# Patient Record
Sex: FEMALE | Race: WHITE | NOT HISPANIC OR LATINO | Employment: UNEMPLOYED | URBAN - METROPOLITAN AREA
[De-identification: names, ages, dates, MRNs, and addresses within clinical notes are randomized per-mention and may not be internally consistent; named-entity substitution may affect disease eponyms.]

---

## 2017-01-16 RX ORDER — CYCLOBENZAPRINE HCL 10 MG
TABLET ORAL
Qty: 30 TABLET | Refills: 0 | Status: SHIPPED | OUTPATIENT
Start: 2017-01-16 | End: 2017-02-10 | Stop reason: SDUPTHER

## 2017-01-16 RX ORDER — DULOXETIN HYDROCHLORIDE 60 MG/1
CAPSULE, DELAYED RELEASE ORAL
Qty: 30 CAPSULE | Refills: 0 | Status: SHIPPED | OUTPATIENT
Start: 2017-01-16 | End: 2017-02-10 | Stop reason: SDUPTHER

## 2017-01-16 RX ORDER — BUSPIRONE HYDROCHLORIDE 10 MG/1
TABLET ORAL
Qty: 90 TABLET | Refills: 0 | Status: SHIPPED | OUTPATIENT
Start: 2017-01-16 | End: 2017-02-10 | Stop reason: SDUPTHER

## 2017-02-10 RX ORDER — CYCLOBENZAPRINE HCL 10 MG
TABLET ORAL
Qty: 30 TABLET | Refills: 0 | Status: SHIPPED | OUTPATIENT
Start: 2017-02-10 | End: 2017-03-13 | Stop reason: SDUPTHER

## 2017-02-10 RX ORDER — DULOXETIN HYDROCHLORIDE 60 MG/1
CAPSULE, DELAYED RELEASE ORAL
Qty: 30 CAPSULE | Refills: 0 | Status: SHIPPED | OUTPATIENT
Start: 2017-02-10 | End: 2017-03-13 | Stop reason: SDUPTHER

## 2017-02-10 RX ORDER — BUSPIRONE HYDROCHLORIDE 10 MG/1
TABLET ORAL
Qty: 90 TABLET | Refills: 0 | Status: SHIPPED | OUTPATIENT
Start: 2017-02-10 | End: 2017-03-13 | Stop reason: SDUPTHER

## 2017-03-06 RX ORDER — BUSPIRONE HYDROCHLORIDE 10 MG/1
TABLET ORAL
Qty: 90 TABLET | Refills: 0 | OUTPATIENT
Start: 2017-03-06

## 2017-03-06 RX ORDER — DULOXETIN HYDROCHLORIDE 60 MG/1
CAPSULE, DELAYED RELEASE ORAL
Qty: 30 CAPSULE | Refills: 0 | OUTPATIENT
Start: 2017-03-06

## 2017-03-06 RX ORDER — CYCLOBENZAPRINE HCL 10 MG
TABLET ORAL
Qty: 30 TABLET | Refills: 0 | OUTPATIENT
Start: 2017-03-06

## 2017-03-13 ENCOUNTER — OFFICE VISIT (OUTPATIENT)
Dept: FAMILY MEDICINE CLINIC | Facility: CLINIC | Age: 42
End: 2017-03-13

## 2017-03-13 VITALS
SYSTOLIC BLOOD PRESSURE: 118 MMHG | OXYGEN SATURATION: 97 % | WEIGHT: 215.4 LBS | HEIGHT: 66 IN | DIASTOLIC BLOOD PRESSURE: 80 MMHG | BODY MASS INDEX: 34.62 KG/M2 | RESPIRATION RATE: 16 BRPM | HEART RATE: 103 BPM

## 2017-03-13 DIAGNOSIS — M54.50 CHRONIC LOW BACK PAIN WITHOUT SCIATICA, UNSPECIFIED BACK PAIN LATERALITY: ICD-10-CM

## 2017-03-13 DIAGNOSIS — F41.1 ANXIETY, GENERALIZED: Primary | ICD-10-CM

## 2017-03-13 DIAGNOSIS — G89.29 CHRONIC LOW BACK PAIN WITHOUT SCIATICA, UNSPECIFIED BACK PAIN LATERALITY: ICD-10-CM

## 2017-03-13 PROCEDURE — 99213 OFFICE O/P EST LOW 20 MIN: CPT | Performed by: FAMILY MEDICINE

## 2017-03-13 RX ORDER — BUSPIRONE HYDROCHLORIDE 10 MG/1
10 TABLET ORAL 3 TIMES DAILY
Qty: 90 TABLET | Refills: 2 | Status: SHIPPED | OUTPATIENT
Start: 2017-03-13 | End: 2017-05-30 | Stop reason: SDUPTHER

## 2017-03-13 RX ORDER — IBUPROFEN 600 MG/1
600 TABLET ORAL EVERY 8 HOURS PRN
Qty: 90 TABLET | Refills: 2 | Status: SHIPPED | OUTPATIENT
Start: 2017-03-13 | End: 2018-01-10 | Stop reason: SDUPTHER

## 2017-03-13 RX ORDER — DULOXETIN HYDROCHLORIDE 60 MG/1
60 CAPSULE, DELAYED RELEASE ORAL DAILY
Qty: 30 CAPSULE | Refills: 2 | Status: SHIPPED | OUTPATIENT
Start: 2017-03-13 | End: 2017-05-28 | Stop reason: SDUPTHER

## 2017-03-13 RX ORDER — CYCLOBENZAPRINE HCL 10 MG
10 TABLET ORAL
Qty: 30 TABLET | Refills: 2 | Status: SHIPPED | OUTPATIENT
Start: 2017-03-13 | End: 2018-09-11 | Stop reason: SDUPTHER

## 2017-03-14 NOTE — PROGRESS NOTES
"Asa Lo is a 41 y.o. female.     Anxiety   Presents for follow-up visit. Symptoms include nervous/anxious behavior. Patient reports no confusion, decreased concentration, depressed mood, dizziness, nausea, palpitations, restlessness, shortness of breath or suicidal ideas. Symptoms occur most days. The severity of symptoms is moderate. The quality of sleep is fair. Nighttime awakenings: occasional.       Back Pain   This is a chronic problem. The problem occurs constantly. The problem is unchanged. The pain is present in the lumbar spine. The quality of the pain is described as aching. The pain does not radiate. The pain is moderate. Pertinent negatives include no abdominal pain, headaches or weakness. Treatments tried: treated at pain clinic.        The following portions of the patient's history were reviewed and updated as appropriate: allergies, current medications, past social history and problem list.    Review of Systems   Constitutional: Negative for appetite change and fatigue.   Respiratory: Negative for chest tightness and shortness of breath.    Cardiovascular: Negative for palpitations.   Gastrointestinal: Negative for abdominal pain, diarrhea and nausea.   Musculoskeletal: Positive for back pain.   Neurological: Negative for dizziness, tremors, weakness, light-headedness and headaches.   Psychiatric/Behavioral: Negative for agitation, behavioral problems, confusion, decreased concentration, dysphoric mood, sleep disturbance and suicidal ideas. The patient is nervous/anxious.         Chronic pain        Objective   Visit Vitals   • /80   • Pulse 103   • Resp 16   • Ht 66\" (167.6 cm)   • Wt 215 lb 6.4 oz (97.7 kg)   • SpO2 97%   • BMI 34.77 kg/m2     Physical Exam   Constitutional: She is oriented to person, place, and time. She appears well-developed and well-nourished. She is cooperative.   HENT:   Head: Normocephalic.   Right Ear: External ear normal.   Left Ear: External ear " normal.   Nose: Nose normal.   Mouth/Throat: Oropharynx is clear and moist.   Eyes: Conjunctivae are normal. Pupils are equal, round, and reactive to light. No scleral icterus.   Neck: Neck supple. Carotid bruit is not present. No thyromegaly present.   Cardiovascular: Normal rate and regular rhythm.    Pulmonary/Chest: Effort normal and breath sounds normal.   Abdominal: There is no hepatosplenomegaly.   Musculoskeletal: Normal range of motion.   Neurological: She is alert and oriented to person, place, and time.   No focal deficits no lateralizing signs   Skin: Skin is warm and dry. No rash noted.   Psychiatric: She has a normal mood and affect. Her behavior is normal. Judgment and thought content normal. Cognition and memory are normal.   Nursing note and vitals reviewed.      Assessment/Plan   Problem List Items Addressed This Visit        Nervous and Auditory    Chronic low back pain       Other    Anxiety, generalized - Primary    Relevant Medications    DULoxetine (CYMBALTA) 60 MG capsule    busPIRone (BUSPAR) 10 MG tablet          New Medications Ordered This Visit   Medications   • DULoxetine (CYMBALTA) 60 MG capsule     Sig: Take 1 capsule by mouth Daily.     Dispense:  30 capsule     Refill:  2   • busPIRone (BUSPAR) 10 MG tablet     Sig: Take 1 tablet by mouth 3 (Three) Times a Day.     Dispense:  90 tablet     Refill:  2     Call office to make appt for further refills.   • cyclobenzaprine (FLEXERIL) 10 MG tablet     Sig: Take 1 tablet by mouth every night at bedtime.     Dispense:  30 tablet     Refill:  2     Call office to make appt for further refills.   • ibuprofen (ADVIL,MOTRIN) 600 MG tablet     Sig: Take 1 tablet by mouth Every 8 (Eight) Hours As Needed for Mild Pain (1-3).     Dispense:  90 tablet     Refill:  2

## 2017-05-30 RX ORDER — BUSPIRONE HYDROCHLORIDE 10 MG/1
TABLET ORAL
Qty: 90 TABLET | Refills: 0 | Status: SHIPPED | OUTPATIENT
Start: 2017-05-30 | End: 2017-06-27 | Stop reason: SDUPTHER

## 2017-05-30 RX ORDER — DULOXETIN HYDROCHLORIDE 60 MG/1
CAPSULE, DELAYED RELEASE ORAL
Qty: 30 CAPSULE | Refills: 3 | Status: SHIPPED | OUTPATIENT
Start: 2017-05-30 | End: 2017-10-02

## 2017-06-27 RX ORDER — BUSPIRONE HYDROCHLORIDE 10 MG/1
TABLET ORAL
Qty: 90 TABLET | Refills: 0 | Status: SHIPPED | OUTPATIENT
Start: 2017-06-27 | End: 2017-07-25 | Stop reason: SDUPTHER

## 2017-07-25 RX ORDER — BUSPIRONE HYDROCHLORIDE 10 MG/1
TABLET ORAL
Qty: 90 TABLET | Refills: 0 | Status: SHIPPED | OUTPATIENT
Start: 2017-07-25 | End: 2017-09-01 | Stop reason: SDUPTHER

## 2017-09-01 RX ORDER — BUSPIRONE HYDROCHLORIDE 10 MG/1
TABLET ORAL
Qty: 90 TABLET | Refills: 0 | Status: SHIPPED | OUTPATIENT
Start: 2017-09-01 | End: 2018-01-10

## 2017-09-27 RX ORDER — DULOXETIN HYDROCHLORIDE 60 MG/1
CAPSULE, DELAYED RELEASE ORAL
Qty: 30 CAPSULE | Refills: 0 | Status: SHIPPED | OUTPATIENT
Start: 2017-09-27 | End: 2017-10-25 | Stop reason: SDUPTHER

## 2017-10-25 RX ORDER — DULOXETIN HYDROCHLORIDE 60 MG/1
CAPSULE, DELAYED RELEASE ORAL
Qty: 30 CAPSULE | Refills: 0 | Status: SHIPPED | OUTPATIENT
Start: 2017-10-25 | End: 2017-11-19 | Stop reason: SDUPTHER

## 2017-10-30 RX ORDER — BUSPIRONE HYDROCHLORIDE 10 MG/1
TABLET ORAL
Qty: 90 TABLET | Refills: 0 | Status: SHIPPED | OUTPATIENT
Start: 2017-10-30 | End: 2018-01-10 | Stop reason: SDUPTHER

## 2017-11-21 RX ORDER — DULOXETIN HYDROCHLORIDE 60 MG/1
CAPSULE, DELAYED RELEASE ORAL
Qty: 30 CAPSULE | Refills: 0 | Status: SHIPPED | OUTPATIENT
Start: 2017-11-21 | End: 2017-12-17 | Stop reason: SDUPTHER

## 2017-11-26 RX ORDER — BUSPIRONE HYDROCHLORIDE 10 MG/1
TABLET ORAL
Qty: 90 TABLET | Refills: 0 | OUTPATIENT
Start: 2017-11-26

## 2017-12-18 RX ORDER — DULOXETIN HYDROCHLORIDE 60 MG/1
CAPSULE, DELAYED RELEASE ORAL
Qty: 30 CAPSULE | Refills: 0 | Status: SHIPPED | OUTPATIENT
Start: 2017-12-18 | End: 2018-01-10 | Stop reason: SDUPTHER

## 2018-01-07 ENCOUNTER — OFFICE VISIT (OUTPATIENT)
Dept: FAMILY MEDICINE CLINIC | Facility: CLINIC | Age: 43
End: 2018-01-07

## 2018-01-07 VITALS
WEIGHT: 202 LBS | HEART RATE: 110 BPM | OXYGEN SATURATION: 99 % | HEIGHT: 66 IN | BODY MASS INDEX: 32.47 KG/M2 | RESPIRATION RATE: 16 BRPM | TEMPERATURE: 98.5 F | SYSTOLIC BLOOD PRESSURE: 124 MMHG | DIASTOLIC BLOOD PRESSURE: 80 MMHG

## 2018-01-07 DIAGNOSIS — J01.10 ACUTE FRONTAL SINUSITIS, RECURRENCE NOT SPECIFIED: Primary | ICD-10-CM

## 2018-01-07 PROCEDURE — 96372 THER/PROPH/DIAG INJ SC/IM: CPT | Performed by: NURSE PRACTITIONER

## 2018-01-07 PROCEDURE — 99213 OFFICE O/P EST LOW 20 MIN: CPT | Performed by: NURSE PRACTITIONER

## 2018-01-07 RX ORDER — METHYLPREDNISOLONE ACETATE 40 MG/ML
40 INJECTION, SUSPENSION INTRA-ARTICULAR; INTRALESIONAL; INTRAMUSCULAR; SOFT TISSUE ONCE
Status: COMPLETED | OUTPATIENT
Start: 2018-01-07 | End: 2018-01-07

## 2018-01-07 RX ORDER — HYDROCODONE BITARTRATE AND ACETAMINOPHEN 5; 325 MG/1; MG/1
TABLET ORAL
COMMUNITY
Start: 2017-12-12 | End: 2018-09-11

## 2018-01-07 RX ORDER — CEPHALEXIN 500 MG/1
500 CAPSULE ORAL 3 TIMES DAILY
Qty: 21 CAPSULE | Refills: 0 | Status: SHIPPED | OUTPATIENT
Start: 2018-01-07 | End: 2018-01-14

## 2018-01-07 RX ORDER — METHYLPREDNISOLONE ACETATE 80 MG/ML
80 INJECTION, SUSPENSION INTRA-ARTICULAR; INTRALESIONAL; INTRAMUSCULAR; SOFT TISSUE ONCE
Qty: 1 ML | Refills: 0 | Status: SHIPPED | OUTPATIENT
Start: 2018-01-07 | End: 2018-01-07

## 2018-01-07 RX ORDER — KETOROLAC TROMETHAMINE 30 MG/ML
60 INJECTION, SOLUTION INTRAMUSCULAR; INTRAVENOUS ONCE
Status: COMPLETED | OUTPATIENT
Start: 2018-01-07 | End: 2018-01-07

## 2018-01-07 RX ADMIN — METHYLPREDNISOLONE ACETATE 40 MG: 40 INJECTION, SUSPENSION INTRA-ARTICULAR; INTRALESIONAL; INTRAMUSCULAR; SOFT TISSUE at 14:30

## 2018-01-07 RX ADMIN — KETOROLAC TROMETHAMINE 60 MG: 30 INJECTION, SOLUTION INTRAMUSCULAR; INTRAVENOUS at 12:36

## 2018-01-07 NOTE — PROGRESS NOTES
Subjective   Elizabeth Lo is a 42 y.o. female.   Chief Complaint   Patient presents with   • Earache   • Headache    Acute Visit only   Earache    There is pain in the right ear. This is a new problem. The current episode started in the past 7 days. The problem occurs constantly. The problem has been unchanged. The pain is mild. Associated symptoms include coughing, headaches, rhinorrhea and a sore throat. Pertinent negatives include no abdominal pain, drainage, hearing loss or vomiting. Treatments tried: mucinex, ibuprofen and aspirin  The treatment provided mild relief. There is no history of a chronic ear infection, hearing loss or a tympanostomy tube.   Headache    This is a new problem. The current episode started in the past 7 days. The problem occurs intermittently. The problem has been waxing and waning. The pain is located in the right unilateral and frontal region. The pain does not radiate. Similar to prior headaches: headache in the past - this time is only on one side.  The quality of the pain is described as pulsating and aching. The pain is at a severity of 5/10. The pain is moderate. Associated symptoms include coughing, ear pain, a fever, muscle aches, rhinorrhea, sinus pressure and a sore throat. Pertinent negatives include no abdominal pain, abnormal behavior, anorexia, back pain, blurred vision, dizziness, drainage, eye pain, eye redness, eye watering, facial sweating, hearing loss or vomiting. The symptoms are aggravated by activity. She has tried NSAIDs (mucinex ) for the symptoms. The treatment provided mild relief. Her past medical history is significant for migraine headaches. There is no history of cancer, cluster headaches, hypertension, immunosuppression, migraines in the family, obesity, pseudotumor cerebri, recent head traumas, sinus disease or TMJ.    Head ache has been off an on  X 2 weeks.   Sinus pressure and thick yellow drainage     The following portions of the patient's  "history were reviewed and updated as appropriate: allergies, current medications, past family history, past medical history, past social history, past surgical history and problem list.    Review of Systems   Constitutional: Positive for fever.   HENT: Positive for ear pain, postnasal drip, rhinorrhea, sinus pain, sinus pressure and sore throat. Negative for hearing loss, trouble swallowing and voice change.         Ears feel full.   Right side of fore head lots of pressure.   Thick yellow nasal drainage    Eyes: Negative for blurred vision, pain and redness.   Respiratory: Positive for cough.    Gastrointestinal: Negative for abdominal pain, anorexia and vomiting.   Musculoskeletal: Negative for back pain.   Neurological: Positive for headaches. Negative for dizziness.       Objective   No Known Allergies  Visit Vitals   • /80   • Pulse 110   • Temp 98.5 °F (36.9 °C)   • Resp 16   • Ht 167.6 cm (66\")   • Wt 91.6 kg (202 lb)   • SpO2 99%   • BMI 32.6 kg/m2       Physical Exam   Constitutional: She is oriented to person, place, and time. She appears well-developed and well-nourished. She is cooperative. She appears ill.   HENT:   Right Ear: Hearing, tympanic membrane, external ear and ear canal normal.   Left Ear: Hearing, tympanic membrane, external ear and ear canal normal.   Nose: Mucosal edema present. Right sinus exhibits frontal sinus tenderness. Right sinus exhibits no maxillary sinus tenderness. Left sinus exhibits no maxillary sinus tenderness and no frontal sinus tenderness.   Mouth/Throat: Uvula is midline and mucous membranes are normal. Posterior oropharyngeal erythema present.   Right frontal tenderness to palpation.   Yellow purulent discharge.      Pulmonary/Chest: Effort normal and breath sounds normal.   Neurological: She is alert and oriented to person, place, and time. She has normal strength. No cranial nerve deficit or sensory deficit. She displays a negative Romberg sign. GCS eye subscore " is 4. GCS verbal subscore is 5. GCS motor subscore is 6.   Skin: Skin is warm, dry and intact.   Vitals reviewed.      Assessment/Plan   Elizabeth was seen today for earache and headache.    Diagnoses and all orders for this visit:    Acute frontal sinusitis, recurrence not specified  -     Discontinue: methylPREDNISolone acetate (DEPO-medrol) 80 MG/ML injection; Inject 1 mL into the shoulder, thigh, or buttocks 1 (One) Time for 1 dose.  -     cephalexin (KEFLEX) 500 MG capsule; Take 1 capsule by mouth 3 (Three) Times a Day for 7 days.  -     ketorolac (TORADOL) injection 60 mg; Inject 60 mg into the shoulder, thigh, or buttocks 1 (One) Time.  -     Chlorcyclizine-Pseudoephed 25-60 MG tablet; Take 25 mg by mouth 2 (Two) Times a Day As Needed (congestion) for up to 21 days.  -     methylPREDNISolone acetate (DEPO-medrol) injection 40 mg; Inject 1 mL into the shoulder, thigh, or buttocks 1 (One) Time.      Follow up as needed.   See instructions for sinusitis.   Patient is agreeable with plan.            NIKOS Zamudio

## 2018-01-10 ENCOUNTER — OFFICE VISIT (OUTPATIENT)
Dept: FAMILY MEDICINE CLINIC | Facility: CLINIC | Age: 43
End: 2018-01-10

## 2018-01-10 VITALS
WEIGHT: 204.8 LBS | OXYGEN SATURATION: 98 % | HEIGHT: 66 IN | DIASTOLIC BLOOD PRESSURE: 80 MMHG | HEART RATE: 108 BPM | RESPIRATION RATE: 16 BRPM | BODY MASS INDEX: 32.92 KG/M2 | SYSTOLIC BLOOD PRESSURE: 112 MMHG

## 2018-01-10 DIAGNOSIS — R59.0 ENLARGED LYMPH NODE IN NECK: ICD-10-CM

## 2018-01-10 DIAGNOSIS — F41.1 ANXIETY, GENERALIZED: Primary | ICD-10-CM

## 2018-01-10 PROCEDURE — 99213 OFFICE O/P EST LOW 20 MIN: CPT | Performed by: FAMILY MEDICINE

## 2018-01-10 RX ORDER — IBUPROFEN 600 MG/1
600 TABLET ORAL EVERY 8 HOURS PRN
Qty: 90 TABLET | Refills: 2 | Status: SHIPPED | OUTPATIENT
Start: 2018-01-10 | End: 2018-09-11 | Stop reason: SDUPTHER

## 2018-01-10 RX ORDER — DULOXETIN HYDROCHLORIDE 60 MG/1
60 CAPSULE, DELAYED RELEASE ORAL DAILY
Qty: 30 CAPSULE | Refills: 2 | Status: SHIPPED | OUTPATIENT
Start: 2018-01-10 | End: 2018-03-28 | Stop reason: SDUPTHER

## 2018-01-10 RX ORDER — BUSPIRONE HYDROCHLORIDE 10 MG/1
10 TABLET ORAL
Qty: 90 TABLET | Refills: 2 | Status: SHIPPED | OUTPATIENT
Start: 2018-01-10 | End: 2018-09-11

## 2018-01-11 NOTE — PROGRESS NOTES
"Asa Lo is a 42 y.o. female.     Anxiety   Presents for follow-up visit. Symptoms include nervous/anxious behavior. Patient reports no compulsions, confusion, decreased concentration, depressed mood, dizziness, insomnia, nausea, panic, shortness of breath or suicidal ideas. Symptoms occur occasionally. The severity of symptoms is moderate. The quality of sleep is fair. Nighttime awakenings: occasional.            The following portions of the patient's history were reviewed and updated as appropriate: allergies, current medications, past social history and problem list.    Review of Systems   Constitutional: Negative for appetite change and fatigue.   HENT: Negative for congestion and postnasal drip.         Enlarged node left side of neck not painful   Respiratory: Negative for cough, chest tightness and shortness of breath.    Gastrointestinal: Negative for abdominal pain, diarrhea and nausea.   Genitourinary: Negative for dysuria and hematuria.   Neurological: Negative for dizziness, tremors, weakness, light-headedness and headaches.   Hematological: Positive for adenopathy.   Psychiatric/Behavioral: Negative for agitation, behavioral problems, confusion, decreased concentration, dysphoric mood, sleep disturbance and suicidal ideas. The patient is nervous/anxious. The patient does not have insomnia.        Objective   /80  Pulse 108  Resp 16  Ht 167.6 cm (66\")  Wt 92.9 kg (204 lb 12.8 oz)  SpO2 98%  BMI 33.06 kg/m2  Physical Exam   Constitutional: She is oriented to person, place, and time. She appears well-developed and well-nourished. She is cooperative.   HENT:   Head: Normocephalic.   Right Ear: External ear normal.   Left Ear: External ear normal.   Nose: Nose normal.   Mouth/Throat: Oropharynx is clear and moist.   Eyes: Conjunctivae are normal. Pupils are equal, round, and reactive to light. No scleral icterus.   Neck: Neck supple. Carotid bruit is not present. No " thyromegaly present.   Solitary node left mid posterior cervical chain freely mobile   Cardiovascular: Normal rate, regular rhythm and normal heart sounds.    Pulmonary/Chest: Effort normal and breath sounds normal.   Abdominal: There is no hepatosplenomegaly.   Musculoskeletal: Normal range of motion.   Lymphadenopathy:     She has cervical adenopathy.   Neurological: She is alert and oriented to person, place, and time.   No focal deficits no lateralizing signs   Skin: Skin is warm and dry. No rash noted.   Psychiatric: She has a normal mood and affect. Her behavior is normal. Thought content normal. Cognition and memory are normal.   Nursing note and vitals reviewed.      Assessment/Plan   Problem List Items Addressed This Visit     Anxiety, generalized - Primary    Relevant Medications    busPIRone (BUSPAR) 10 MG tablet    DULoxetine (CYMBALTA) 60 MG capsule      Other Visit Diagnoses     Enlarged lymph node in neck              New Medications Ordered This Visit   Medications   • busPIRone (BUSPAR) 10 MG tablet     Sig: Take 1 tablet by mouth 3 (Three) Times a Day With Meals.     Dispense:  90 tablet     Refill:  2   • DULoxetine (CYMBALTA) 60 MG capsule     Sig: Take 1 capsule by mouth Daily.     Dispense:  30 capsule     Refill:  2   • ibuprofen (ADVIL,MOTRIN) 600 MG tablet     Sig: Take 1 tablet by mouth Every 8 (Eight) Hours As Needed for Mild Pain .     Dispense:  90 tablet     Refill:  2     rtc if node enlarges

## 2018-01-15 RX ORDER — DULOXETIN HYDROCHLORIDE 60 MG/1
CAPSULE, DELAYED RELEASE ORAL
Qty: 30 CAPSULE | Refills: 0 | OUTPATIENT
Start: 2018-01-15

## 2018-03-29 RX ORDER — DULOXETIN HYDROCHLORIDE 60 MG/1
60 CAPSULE, DELAYED RELEASE ORAL DAILY
Qty: 30 CAPSULE | Refills: 5 | Status: SHIPPED | OUTPATIENT
Start: 2018-03-29 | End: 2018-09-11 | Stop reason: SDUPTHER

## 2018-09-11 ENCOUNTER — OFFICE VISIT (OUTPATIENT)
Dept: FAMILY MEDICINE CLINIC | Facility: CLINIC | Age: 43
End: 2018-09-11

## 2018-09-11 VITALS
SYSTOLIC BLOOD PRESSURE: 124 MMHG | BODY MASS INDEX: 36.17 KG/M2 | HEIGHT: 66 IN | WEIGHT: 225.1 LBS | RESPIRATION RATE: 16 BRPM | DIASTOLIC BLOOD PRESSURE: 86 MMHG | HEART RATE: 89 BPM | OXYGEN SATURATION: 98 %

## 2018-09-11 DIAGNOSIS — M54.5 CHRONIC MIDLINE LOW BACK PAIN, WITH SCIATICA PRESENCE UNSPECIFIED: Primary | ICD-10-CM

## 2018-09-11 DIAGNOSIS — G89.29 CHRONIC MIDLINE LOW BACK PAIN, WITH SCIATICA PRESENCE UNSPECIFIED: Primary | ICD-10-CM

## 2018-09-11 DIAGNOSIS — F41.1 ANXIETY, GENERALIZED: ICD-10-CM

## 2018-09-11 PROCEDURE — 99213 OFFICE O/P EST LOW 20 MIN: CPT | Performed by: FAMILY MEDICINE

## 2018-09-11 RX ORDER — DULOXETIN HYDROCHLORIDE 60 MG/1
60 CAPSULE, DELAYED RELEASE ORAL DAILY
Qty: 30 CAPSULE | Refills: 5 | Status: SHIPPED | OUTPATIENT
Start: 2018-09-11 | End: 2019-02-04 | Stop reason: SDUPTHER

## 2018-09-11 RX ORDER — IBUPROFEN 600 MG/1
600 TABLET ORAL EVERY 8 HOURS PRN
Qty: 90 TABLET | Refills: 2 | Status: SHIPPED | OUTPATIENT
Start: 2018-09-11 | End: 2019-08-05 | Stop reason: SDUPTHER

## 2018-09-11 RX ORDER — CYCLOBENZAPRINE HCL 10 MG
10 TABLET ORAL 3 TIMES DAILY PRN
Qty: 90 TABLET | Refills: 2 | Status: SHIPPED | OUTPATIENT
Start: 2018-09-11 | End: 2018-12-11 | Stop reason: SDUPTHER

## 2018-09-11 NOTE — PROGRESS NOTES
Subjective   Elizabeth Lo is a 43 y.o. female.     Anxiety   Presents for follow-up visit. Symptoms include nervous/anxious behavior. Patient reports no confusion, decreased concentration, depressed mood, dizziness, nausea, palpitations, restlessness, shortness of breath or suicidal ideas. Symptoms occur most days. The severity of symptoms is moderate. The quality of sleep is fair. Nighttime awakenings: occasional.       Back Pain   This is a chronic (Chronic back pain has been under the care of a pain treatment center but now needs another referral to another pain center.) problem. The problem occurs constantly. The problem is unchanged. The pain is present in the lumbar spine. The quality of the pain is described as aching. The pain does not radiate. The pain is moderate. Pertinent negatives include no abdominal pain, dysuria, headaches, numbness or weakness. She has tried analgesics (treated at pain clinic) for the symptoms. The treatment provided moderate relief.        The following portions of the patient's history were reviewed and updated as appropriate: allergies, current medications, past social history and problem list.    Review of Systems   Constitutional: Negative.  Negative for appetite change and fatigue.   HENT: Negative for congestion and sore throat.    Respiratory: Negative.  Negative for chest tightness and shortness of breath.    Cardiovascular: Negative for palpitations.   Gastrointestinal: Negative.  Negative for abdominal pain, diarrhea and nausea.   Genitourinary: Negative.  Negative for dysuria and hematuria.   Musculoskeletal: Positive for back pain. Negative for arthralgias, gait problem and myalgias.   Neurological: Negative for dizziness, tremors, weakness, light-headedness, numbness and headaches.   Psychiatric/Behavioral: Positive for dysphoric mood and sleep disturbance. Negative for agitation, behavioral problems, confusion, decreased concentration and suicidal ideas. The  "patient is nervous/anxious.        Objective   /86   Pulse 89   Resp 16   Ht 167.6 cm (66\")   Wt 102 kg (225 lb 1.6 oz)   SpO2 98%   BMI 36.33 kg/m²   Physical Exam   Constitutional: She is oriented to person, place, and time. She appears well-developed and well-nourished. She is cooperative.   HENT:   Head: Normocephalic and atraumatic.   Eyes: Pupils are equal, round, and reactive to light. Conjunctivae are normal. No scleral icterus.   Neck: Neck supple. No JVD present. Carotid bruit is not present. No thyromegaly present.   Cardiovascular: Normal rate and regular rhythm.    Pulmonary/Chest: Effort normal and breath sounds normal.   Abdominal: There is no hepatosplenomegaly.   Musculoskeletal: Normal range of motion.   Back exam unchanged   Neurological: She is alert and oriented to person, place, and time.   No focal deficits no lateralizing signs   Skin: Skin is warm and dry. No rash noted.   Psychiatric: She has a normal mood and affect. Cognition and memory are normal.   Nursing note and vitals reviewed.      Assessment/Plan   Problem List Items Addressed This Visit     Chronic low back pain - Primary    Relevant Orders    Ambulatory Referral to Pain Management    Anxiety, generalized    Relevant Medications    DULoxetine (CYMBALTA) 60 MG capsule          New Medications Ordered This Visit   Medications   • cyclobenzaprine (FLEXERIL) 10 MG tablet     Sig: Take 1 tablet by mouth 3 (Three) Times a Day As Needed for Muscle Spasms.     Dispense:  90 tablet     Refill:  2     Call office to make appt for further refills.   • DULoxetine (CYMBALTA) 60 MG capsule     Sig: Take 1 capsule by mouth Daily.     Dispense:  30 capsule     Refill:  5   • ibuprofen (ADVIL,MOTRIN) 600 MG tablet     Sig: Take 1 tablet by mouth Every 8 (Eight) Hours As Needed for Mild Pain .     Dispense:  90 tablet     Refill:  2              "

## 2018-12-11 RX ORDER — CYCLOBENZAPRINE HCL 10 MG
TABLET ORAL
Qty: 90 TABLET | Refills: 0 | Status: SHIPPED | OUTPATIENT
Start: 2018-12-11 | End: 2019-01-04 | Stop reason: SDUPTHER

## 2019-01-07 RX ORDER — CYCLOBENZAPRINE HCL 10 MG
TABLET ORAL
Qty: 90 TABLET | Refills: 0 | Status: SHIPPED | OUTPATIENT
Start: 2019-01-07 | End: 2019-02-04 | Stop reason: SDUPTHER

## 2019-02-04 ENCOUNTER — OFFICE VISIT (OUTPATIENT)
Dept: FAMILY MEDICINE CLINIC | Facility: CLINIC | Age: 44
End: 2019-02-04

## 2019-02-04 VITALS
RESPIRATION RATE: 16 BRPM | HEART RATE: 118 BPM | WEIGHT: 204.2 LBS | SYSTOLIC BLOOD PRESSURE: 132 MMHG | HEIGHT: 66 IN | DIASTOLIC BLOOD PRESSURE: 80 MMHG | BODY MASS INDEX: 32.82 KG/M2 | OXYGEN SATURATION: 98 %

## 2019-02-04 DIAGNOSIS — G89.29 CHRONIC BILATERAL LOW BACK PAIN WITH BILATERAL SCIATICA: ICD-10-CM

## 2019-02-04 DIAGNOSIS — F41.1 ANXIETY, GENERALIZED: Primary | ICD-10-CM

## 2019-02-04 DIAGNOSIS — M54.42 CHRONIC BILATERAL LOW BACK PAIN WITH BILATERAL SCIATICA: ICD-10-CM

## 2019-02-04 DIAGNOSIS — M54.41 CHRONIC BILATERAL LOW BACK PAIN WITH BILATERAL SCIATICA: ICD-10-CM

## 2019-02-04 PROCEDURE — 99213 OFFICE O/P EST LOW 20 MIN: CPT | Performed by: FAMILY MEDICINE

## 2019-02-04 PROCEDURE — 90471 IMMUNIZATION ADMIN: CPT | Performed by: FAMILY MEDICINE

## 2019-02-04 PROCEDURE — 90686 IIV4 VACC NO PRSV 0.5 ML IM: CPT | Performed by: FAMILY MEDICINE

## 2019-02-04 RX ORDER — DULOXETIN HYDROCHLORIDE 60 MG/1
60 CAPSULE, DELAYED RELEASE ORAL DAILY
Qty: 30 CAPSULE | Refills: 5 | Status: SHIPPED | OUTPATIENT
Start: 2019-02-04 | End: 2019-08-05 | Stop reason: SDUPTHER

## 2019-02-04 RX ORDER — CYCLOBENZAPRINE HCL 10 MG
10 TABLET ORAL 3 TIMES DAILY PRN
Qty: 90 TABLET | Refills: 2 | Status: SHIPPED | OUTPATIENT
Start: 2019-02-04 | End: 2019-05-09 | Stop reason: SDUPTHER

## 2019-02-05 RX ORDER — GABAPENTIN 100 MG/1
CAPSULE ORAL
Qty: 60 CAPSULE | Refills: 2
Start: 2019-02-05 | End: 2019-11-06 | Stop reason: DRUGHIGH

## 2019-05-06 RX ORDER — GABAPENTIN 100 MG/1
CAPSULE ORAL
Qty: 60 CAPSULE | Refills: 0 | OUTPATIENT
Start: 2019-05-06

## 2019-05-09 RX ORDER — CYCLOBENZAPRINE HCL 10 MG
TABLET ORAL
Qty: 90 TABLET | Refills: 0 | Status: SHIPPED | OUTPATIENT
Start: 2019-05-09 | End: 2019-08-05 | Stop reason: SDUPTHER

## 2019-06-03 RX ORDER — GABAPENTIN 100 MG/1
CAPSULE ORAL
Qty: 60 CAPSULE | Refills: 0 | OUTPATIENT
Start: 2019-06-03

## 2019-08-05 ENCOUNTER — OFFICE VISIT (OUTPATIENT)
Dept: FAMILY MEDICINE CLINIC | Facility: CLINIC | Age: 44
End: 2019-08-05

## 2019-08-05 VITALS
SYSTOLIC BLOOD PRESSURE: 120 MMHG | WEIGHT: 191.6 LBS | TEMPERATURE: 97.3 F | DIASTOLIC BLOOD PRESSURE: 78 MMHG | RESPIRATION RATE: 16 BRPM | HEART RATE: 99 BPM | OXYGEN SATURATION: 97 % | HEIGHT: 66 IN | BODY MASS INDEX: 30.79 KG/M2

## 2019-08-05 DIAGNOSIS — M54.42 CHRONIC BILATERAL LOW BACK PAIN WITH BILATERAL SCIATICA: ICD-10-CM

## 2019-08-05 DIAGNOSIS — S76.012A STRAIN OF HIP FLEXOR, LEFT, INITIAL ENCOUNTER: Primary | ICD-10-CM

## 2019-08-05 DIAGNOSIS — F41.1 ANXIETY, GENERALIZED: ICD-10-CM

## 2019-08-05 DIAGNOSIS — M54.41 CHRONIC BILATERAL LOW BACK PAIN WITH BILATERAL SCIATICA: ICD-10-CM

## 2019-08-05 DIAGNOSIS — G89.29 CHRONIC BILATERAL LOW BACK PAIN WITH BILATERAL SCIATICA: ICD-10-CM

## 2019-08-05 PROCEDURE — 99213 OFFICE O/P EST LOW 20 MIN: CPT | Performed by: FAMILY MEDICINE

## 2019-08-05 RX ORDER — DULOXETIN HYDROCHLORIDE 60 MG/1
60 CAPSULE, DELAYED RELEASE ORAL DAILY
Qty: 30 CAPSULE | Refills: 5 | Status: SHIPPED | OUTPATIENT
Start: 2019-08-05 | End: 2020-02-25

## 2019-08-05 RX ORDER — CYCLOBENZAPRINE HCL 10 MG
10 TABLET ORAL 3 TIMES DAILY PRN
Qty: 90 TABLET | Refills: 2 | Status: SHIPPED | OUTPATIENT
Start: 2019-08-05 | End: 2019-10-25 | Stop reason: SDUPTHER

## 2019-08-05 RX ORDER — IBUPROFEN 600 MG/1
600 TABLET ORAL EVERY 8 HOURS PRN
Qty: 90 TABLET | Refills: 2 | Status: SHIPPED | OUTPATIENT
Start: 2019-08-05 | End: 2019-11-06 | Stop reason: SDUPTHER

## 2019-08-05 NOTE — PROGRESS NOTES
Subjective   Elizabeth Lo is a 43 y.o. female.     Hip Pain    The incident occurred more than 1 week ago (Painful and difficult to get up from a chair). The injury mechanism is unknown. The pain is present in the right hip and left hip. The quality of the pain is described as aching. The pain is moderate. The pain has been fluctuating since onset. Pertinent negatives include no numbness or tingling. The symptoms are aggravated by movement. She has tried rest and NSAIDs for the symptoms. The treatment provided mild relief.   Anxiety   Presents for follow-up visit. Symptoms include irritability and nervous/anxious behavior. Patient reports no confusion, decreased concentration, dizziness, nausea, palpitations, shortness of breath or suicidal ideas. The severity of symptoms is moderate. The quality of sleep is fair. Nighttime awakenings: one to two.       Back Pain   This is a chronic (Has been unable to get into a pain clinic recently turned away by ) problem. The current episode started more than 1 year ago. The problem occurs constantly. The problem is unchanged. The pain is present in the lumbar spine. The pain radiates to the left thigh and right thigh. The pain is moderate. The symptoms are aggravated by position. Associated symptoms include paresthesias. Pertinent negatives include no abdominal pain, bladder incontinence, bowel incontinence, dysuria, fever, headaches, numbness, tingling or weakness.        The following portions of the patient's history were reviewed and updated as appropriate: allergies, current medications, past social history and problem list.    Review of Systems   Constitutional: Positive for irritability. Negative for appetite change, fatigue and fever.   Respiratory: Negative for chest tightness and shortness of breath.    Cardiovascular: Negative for palpitations.   Gastrointestinal: Negative for abdominal pain, bowel incontinence, diarrhea and nausea.   Genitourinary: Negative for  "bladder incontinence and dysuria.   Musculoskeletal: Positive for back pain and gait problem. Negative for joint swelling.   Neurological: Positive for paresthesias. Negative for dizziness, tingling, tremors, weakness, light-headedness, numbness and headaches.   Psychiatric/Behavioral: Positive for dysphoric mood and sleep disturbance. Negative for agitation, behavioral problems, confusion, decreased concentration and suicidal ideas. The patient is nervous/anxious.        Objective   /78   Pulse 99   Temp 97.3 °F (36.3 °C)   Resp 16   Ht 167.6 cm (66\")   Wt 86.9 kg (191 lb 9.6 oz)   SpO2 97%   BMI 30.93 kg/m²   Physical Exam   Constitutional: She is oriented to person, place, and time. She appears well-developed and well-nourished. She is cooperative.   HENT:   Head: Normocephalic.   Right Ear: External ear normal.   Left Ear: External ear normal.   Nose: Nose normal.   Mouth/Throat: Oropharynx is clear and moist.   Eyes: Conjunctivae are normal. Pupils are equal, round, and reactive to light. No scleral icterus.   Neck: Neck supple. Carotid bruit is not present. No thyromegaly present.   Cardiovascular: Normal rate and regular rhythm.   Pulmonary/Chest: Effort normal and breath sounds normal.   Abdominal: There is no hepatosplenomegaly.   Musculoskeletal: Normal range of motion.   Pain with hip flexion or motion of the hips normal peripheral pulses intact sensation intact strength is normal   Neurological: She is alert and oriented to person, place, and time.   No focal deficits no lateralizing signs   Skin: Skin is warm and dry. No rash noted.   Psychiatric: She has a normal mood and affect. Cognition and memory are normal.   Nursing note and vitals reviewed.      Assessment/Plan   Problem List Items Addressed This Visit        Active Problems    Chronic bilateral low back pain with bilateral sciatica    Anxiety, generalized    Relevant Medications    DULoxetine (CYMBALTA) 60 MG capsule      Other " Visit Diagnoses     Strain of hip flexor, left, initial encounter    -  Primary          New Medications Ordered This Visit   Medications   • ibuprofen (ADVIL,MOTRIN) 600 MG tablet     Sig: Take 1 tablet by mouth Every 8 (Eight) Hours As Needed for Mild Pain .     Dispense:  90 tablet     Refill:  2   • DULoxetine (CYMBALTA) 60 MG capsule     Sig: Take 1 capsule by mouth Daily.     Dispense:  30 capsule     Refill:  5   • cyclobenzaprine (FLEXERIL) 10 MG tablet     Sig: Take 1 tablet by mouth 3 (Three) Times a Day As Needed for Muscle Spasms. for muscle spams     Dispense:  90 tablet     Refill:  2     Request referral to another pain management clinic for her chronic low back pain.  Recommend gentle range of motion exercises for her hips and to avoid overuse continue ibuprofen 600 as needed.  If symptoms persist will okay physical therapy evaluation.

## 2019-10-27 RX ORDER — CYCLOBENZAPRINE HCL 10 MG
TABLET ORAL
Qty: 90 TABLET | Refills: 0 | Status: SHIPPED | OUTPATIENT
Start: 2019-10-27 | End: 2019-11-06

## 2019-10-31 RX ORDER — GABAPENTIN 300 MG/1
CAPSULE ORAL
Qty: 30 CAPSULE | Refills: 0 | OUTPATIENT
Start: 2019-10-31

## 2019-11-05 ENCOUNTER — TELEPHONE (OUTPATIENT)
Dept: PEDIATRICS | Facility: OTHER | Age: 44
End: 2019-11-05

## 2019-11-05 NOTE — TELEPHONE ENCOUNTER
Pt has appt tomorrow w/ dr Carvajal and wants to know if she needs to come in earlier for blood work? Pt would like someone to give her a call today and let her know if she needs to come in earlier or may she eat before coming?

## 2019-11-06 ENCOUNTER — OFFICE VISIT (OUTPATIENT)
Dept: FAMILY MEDICINE CLINIC | Facility: CLINIC | Age: 44
End: 2019-11-06

## 2019-11-06 VITALS
DIASTOLIC BLOOD PRESSURE: 80 MMHG | HEIGHT: 66 IN | BODY MASS INDEX: 30.22 KG/M2 | OXYGEN SATURATION: 98 % | SYSTOLIC BLOOD PRESSURE: 122 MMHG | WEIGHT: 188 LBS | HEART RATE: 88 BPM | RESPIRATION RATE: 16 BRPM

## 2019-11-06 DIAGNOSIS — G89.29 CHRONIC BILATERAL LOW BACK PAIN WITH BILATERAL SCIATICA: ICD-10-CM

## 2019-11-06 DIAGNOSIS — Z12.31 BREAST CANCER SCREENING BY MAMMOGRAM: ICD-10-CM

## 2019-11-06 DIAGNOSIS — M54.41 CHRONIC BILATERAL LOW BACK PAIN WITH BILATERAL SCIATICA: ICD-10-CM

## 2019-11-06 DIAGNOSIS — F41.1 ANXIETY, GENERALIZED: Primary | ICD-10-CM

## 2019-11-06 DIAGNOSIS — M54.42 CHRONIC BILATERAL LOW BACK PAIN WITH BILATERAL SCIATICA: ICD-10-CM

## 2019-11-06 LAB
25(OH)D3 SERPL-MCNC: 24.6 NG/ML (ref 30–100)
ALBUMIN SERPL-MCNC: 3.8 G/DL (ref 3.5–5.2)
ALBUMIN/GLOB SERPL: 1.2 G/DL
ALP SERPL-CCNC: 76 U/L (ref 39–117)
ALT SERPL W P-5'-P-CCNC: 10 U/L (ref 1–33)
ANION GAP SERPL CALCULATED.3IONS-SCNC: 10.4 MMOL/L (ref 5–15)
AST SERPL-CCNC: 15 U/L (ref 1–32)
BILIRUB SERPL-MCNC: 0.5 MG/DL (ref 0.2–1.2)
BUN BLD-MCNC: 10 MG/DL (ref 6–20)
BUN/CREAT SERPL: 14.9 (ref 7–25)
CALCIUM SPEC-SCNC: 9.2 MG/DL (ref 8.6–10.5)
CHLORIDE SERPL-SCNC: 104 MMOL/L (ref 98–107)
CHOLEST SERPL-MCNC: 153 MG/DL (ref 0–200)
CO2 SERPL-SCNC: 26.6 MMOL/L (ref 22–29)
CREAT BLD-MCNC: 0.67 MG/DL (ref 0.57–1)
DEPRECATED RDW RBC AUTO: 42.1 FL (ref 37–54)
ERYTHROCYTE [DISTWIDTH] IN BLOOD BY AUTOMATED COUNT: 12.4 % (ref 12.3–15.4)
GFR SERPL CREATININE-BSD FRML MDRD: 97 ML/MIN/1.73
GLOBULIN UR ELPH-MCNC: 3.3 GM/DL
GLUCOSE BLD-MCNC: 84 MG/DL (ref 65–99)
HCT VFR BLD AUTO: 40.3 % (ref 34–46.6)
HDLC SERPL-MCNC: 44 MG/DL (ref 40–60)
HGB BLD-MCNC: 13.2 G/DL (ref 12–15.9)
LDLC SERPL CALC-MCNC: 97 MG/DL (ref 0–100)
LDLC/HDLC SERPL: 2.21 {RATIO}
MCH RBC QN AUTO: 30.1 PG (ref 26.6–33)
MCHC RBC AUTO-ENTMCNC: 32.8 G/DL (ref 31.5–35.7)
MCV RBC AUTO: 91.8 FL (ref 79–97)
PLATELET # BLD AUTO: 211 10*3/MM3 (ref 140–450)
PMV BLD AUTO: 10.5 FL (ref 6–12)
POTASSIUM BLD-SCNC: 4.5 MMOL/L (ref 3.5–5.2)
PROT SERPL-MCNC: 7.1 G/DL (ref 6–8.5)
RBC # BLD AUTO: 4.39 10*6/MM3 (ref 3.77–5.28)
SODIUM BLD-SCNC: 141 MMOL/L (ref 136–145)
TRIGL SERPL-MCNC: 58 MG/DL (ref 0–150)
TSH SERPL DL<=0.05 MIU/L-ACNC: 0.5 UIU/ML (ref 0.27–4.2)
VLDLC SERPL-MCNC: 11.6 MG/DL (ref 5–40)
WBC NRBC COR # BLD: 8.39 10*3/MM3 (ref 3.4–10.8)

## 2019-11-06 PROCEDURE — 99213 OFFICE O/P EST LOW 20 MIN: CPT | Performed by: FAMILY MEDICINE

## 2019-11-06 PROCEDURE — 90471 IMMUNIZATION ADMIN: CPT | Performed by: FAMILY MEDICINE

## 2019-11-06 PROCEDURE — 80050 GENERAL HEALTH PANEL: CPT | Performed by: FAMILY MEDICINE

## 2019-11-06 PROCEDURE — 80061 LIPID PANEL: CPT | Performed by: FAMILY MEDICINE

## 2019-11-06 PROCEDURE — 90674 CCIIV4 VAC NO PRSV 0.5 ML IM: CPT | Performed by: FAMILY MEDICINE

## 2019-11-06 PROCEDURE — 82306 VITAMIN D 25 HYDROXY: CPT | Performed by: FAMILY MEDICINE

## 2019-11-06 RX ORDER — GABAPENTIN 300 MG/1
300 CAPSULE ORAL 3 TIMES DAILY
Refills: 1 | COMMUNITY
Start: 2019-10-30 | End: 2020-03-09 | Stop reason: DRUGHIGH

## 2019-11-06 RX ORDER — METHOCARBAMOL 750 MG/1
750 TABLET, FILM COATED ORAL 3 TIMES DAILY PRN
Refills: 3 | COMMUNITY
Start: 2019-10-30 | End: 2021-02-08

## 2019-11-06 RX ORDER — OXYCODONE AND ACETAMINOPHEN 7.5; 325 MG/1; MG/1
1 TABLET ORAL 3 TIMES DAILY
Refills: 0 | COMMUNITY
Start: 2019-11-04 | End: 2021-11-30

## 2019-11-06 RX ORDER — IBUPROFEN 600 MG/1
600 TABLET ORAL EVERY 8 HOURS PRN
Qty: 90 TABLET | Refills: 5 | Status: SHIPPED | OUTPATIENT
Start: 2019-11-06 | End: 2020-10-28 | Stop reason: SDUPTHER

## 2019-11-07 NOTE — PROGRESS NOTES
"Subjective   Elizabeth Lo is a 44 y.o. female.       Patient now has a pain medicine doctor and is doing better.      Anxiety   Presents for follow-up visit. Symptoms include nervous/anxious behavior. Patient reports no confusion, decreased concentration, dizziness, insomnia, nausea, panic, shortness of breath or suicidal ideas. Symptoms occur most days. The severity of symptoms is moderate. The quality of sleep is fair. Nighttime awakenings: occasional.            The following portions of the patient's history were reviewed and updated as appropriate: allergies, current medications, past social history and problem list.    Review of Systems   Constitutional: Negative for appetite change and fatigue.   HENT: Negative for congestion and sinus pressure.    Respiratory: Negative for chest tightness and shortness of breath.    Gastrointestinal: Negative for abdominal pain, diarrhea and nausea.   Endocrine: Negative for cold intolerance and heat intolerance.   Genitourinary: Negative for dysuria and genital sores.   Musculoskeletal: Positive for back pain.   Neurological: Negative for dizziness, tremors, weakness, light-headedness and headaches.   Psychiatric/Behavioral: Positive for dysphoric mood and sleep disturbance. Negative for agitation, behavioral problems, confusion, decreased concentration and suicidal ideas. The patient is nervous/anxious. The patient does not have insomnia.        Objective   /80   Pulse 88   Resp 16   Ht 167.6 cm (66\")   Wt 85.3 kg (188 lb)   SpO2 98%   BMI 30.34 kg/m²   Physical Exam   Constitutional: She is oriented to person, place, and time. She appears well-developed and well-nourished. She is cooperative.   HENT:   Head: Normocephalic.   Right Ear: External ear normal.   Left Ear: External ear normal.   Nose: Nose normal.   Mouth/Throat: Oropharynx is clear and moist.   Eyes: Conjunctivae are normal. Pupils are equal, round, and reactive to light. No scleral icterus. "   Neck: Neck supple. Carotid bruit is not present. No thyromegaly present.   Cardiovascular: Normal rate, regular rhythm and normal heart sounds.   Pulmonary/Chest: Effort normal and breath sounds normal.   Abdominal: There is no hepatosplenomegaly.   Musculoskeletal: Normal range of motion. She exhibits no edema.   Neurological: She is alert and oriented to person, place, and time.   No focal deficits no lateralizing signs   Skin: Skin is warm and dry. No rash noted.   Psychiatric: She has a normal mood and affect. Cognition and memory are normal.   Nursing note and vitals reviewed.      Assessment/Plan   Problem List Items Addressed This Visit        Active Problems    Chronic bilateral low back pain with bilateral sciatica    Relevant Orders    Comprehensive Metabolic Panel (Completed)    Lipid Panel (Completed)    TSH (Completed)    CBC (No Diff) (Completed)    Vitamin D 25 Hydroxy (Completed)    Anxiety, generalized - Primary    Relevant Orders    Comprehensive Metabolic Panel (Completed)    Lipid Panel (Completed)    TSH (Completed)    CBC (No Diff) (Completed)    Vitamin D 25 Hydroxy (Completed)      Other Visit Diagnoses     Breast cancer screening by mammogram        Relevant Orders    Mammo Screening Digital Tomosynthesis Bilateral With CAD          New Medications Ordered This Visit   Medications   • ibuprofen (ADVIL,MOTRIN) 600 MG tablet     Sig: Take 1 tablet by mouth Every 8 (Eight) Hours As Needed for Mild Pain .     Dispense:  90 tablet     Refill:  5

## 2019-11-18 ENCOUNTER — TRANSCRIBE ORDERS (OUTPATIENT)
Dept: ADMINISTRATIVE | Facility: HOSPITAL | Age: 44
End: 2019-11-18

## 2019-11-18 ENCOUNTER — HOSPITAL ENCOUNTER (OUTPATIENT)
Dept: GENERAL RADIOLOGY | Facility: HOSPITAL | Age: 44
Discharge: HOME | End: 2019-11-18
Admitting: PAIN MEDICINE

## 2019-11-18 DIAGNOSIS — M96.1 LUMBAR POST-LAMINECTOMY SYNDROME: Primary | ICD-10-CM

## 2019-11-18 DIAGNOSIS — M96.1 POSTLAMINECTOMY SYNDROME, NOT ELSEWHERE CLASSIFIED: ICD-10-CM

## 2019-11-18 PROCEDURE — 72114 X-RAY EXAM L-S SPINE BENDING: CPT

## 2019-12-18 ENCOUNTER — TRANSCRIBE ORDERS (OUTPATIENT)
Dept: PHYSICAL THERAPY | Facility: CLINIC | Age: 44
End: 2019-12-18

## 2019-12-18 ENCOUNTER — TREATMENT (OUTPATIENT)
Dept: PHYSICAL THERAPY | Facility: CLINIC | Age: 44
End: 2019-12-18

## 2019-12-18 DIAGNOSIS — M54.41 CHRONIC BILATERAL LOW BACK PAIN WITH BILATERAL SCIATICA: Primary | ICD-10-CM

## 2019-12-18 DIAGNOSIS — G89.29 CHRONIC BILATERAL LOW BACK PAIN WITH BILATERAL SCIATICA: Primary | ICD-10-CM

## 2019-12-18 DIAGNOSIS — M54.42 CHRONIC BILATERAL LOW BACK PAIN WITH BILATERAL SCIATICA: Primary | ICD-10-CM

## 2019-12-18 DIAGNOSIS — Z78.9 DECREASED ACTIVITIES OF DAILY LIVING (ADL): ICD-10-CM

## 2019-12-18 DIAGNOSIS — M54.50 LOW BACK PAIN, UNSPECIFIED BACK PAIN LATERALITY, UNSPECIFIED CHRONICITY, UNSPECIFIED WHETHER SCIATICA PRESENT: Primary | ICD-10-CM

## 2019-12-18 PROCEDURE — 97161 PT EVAL LOW COMPLEX 20 MIN: CPT | Performed by: PHYSICAL THERAPIST

## 2019-12-18 NOTE — PROGRESS NOTES
Physical Therapy Initial Evaluation and Plan of Care      Patient: Elizabeth Lo   : 1975  Diagnosis/ICD-10 Code:  Chronic bilateral low back pain with bilateral sciatica [M54.42, M54.41, G89.29]  Referring practitioner: Daja Ortiz MD  Date of Initial Visit: 2019  Today's Date: 2019  Patient seen for 1 sessions           Subjective Questionnaire: Oswestry:   Subjective Evaluation    History of Present Illness  Mechanism of injury: Pt underwent back surgery in -L5-S1 fusion. Recovered fairly well but has continued pain since. Says that she quit taking care of herself in order to care for her daughter but has trouble keeping up with her. Complains of pn throughout the low back and hips into her legs that is constant. Experiences tingling in B legs, L>R into her feet and toes. Pn managed some w/ neurontin. She is worried that w/ time she may regress to where she was prior to surgery. Unable to tolerate prolonged positioning whether sitting or standing, takes extended time to complete housework. Has been several years since doing any PT.    Subjective comment: chronic low back pain  Patient Occupation: unemployed Quality of life: fair    Pain  Current pain ratin  At best pain rating: 3  At worst pain ratin  Quality: dull ache, sharp and knife-like  Aggravating factors: lifting, prolonged positioning, squatting and repetitive movement    Diagnostic Tests  X-ray: abnormal (evidence of L5-S1 fusion, multilevel degenerative changes)    Treatments  Previous treatment: physical therapy  Current treatment: medication  Patient Goals  Patient goals for therapy: increased strength and decreased pain           Treatment  Exercise 1  Exercise Name 1: PPT  Sets/Reps 1: 10  Exercise 2  Exercise Name 2: bridges  Sets/Reps 2: 10  Exercise 3  Exercise Name 3: standing quad stretch  Sets/Reps 3: 3  Time 3: 15 sec  Exercise 4  Exercise Name 4: seated piriformis stretch  Sets/Reps 4:  3  Time 4: 15 sec             Objective       Active Range of Motion   Left Hip   External rotation (90/90): 42 degrees   Internal rotation (90/90): 15 degrees     Right Hip   External rotation (90/90): 45 degrees   Internal rotation (90/90): 20 degrees     Additional Active Range of Motion Details  Trunk Flxn fingertips to ankles with pn  Trunk Extn 25% with pn, bends knees  Trunk Rotation L 100%  R 100% with pn  Trunk SB L fingertips to KJL   R fingertips to superior patella with pn          Strength/Myotome Testing     Left Hip   Planes of Motion   Flexion: 4+  Extension: 4-  Abduction: 5  External rotation: 4    Right Hip   Planes of Motion   Flexion: 4+  Extension: 4-  Abduction: 5  External rotation: 4-    Left Knee   Flexion: 5  Extension: 4+    Right Knee   Flexion: 5  Extension: 5    Left Ankle/Foot   Dorsiflexion: 4+  Plantar flexion: 5    Right Ankle/Foot   Dorsiflexion: 4+  Plantar flexion: 5    Muscle Activation   Patient unable to activate left transverse abdominals, left multifidus, right transverse abdominals and right multifidus.     Tests     Lumbar     Left   Positive quadrant.   Negative passive SLR.     Right   Positive quadrant.   Negative passive SLR.          Assessment & Plan     Assessment  Impairments: abnormal or restricted ROM, activity intolerance, impaired physical strength, lacks appropriate home exercise program and pain with function  Assessment details: Pt is a 44 YOF who presents to PT w/ evolving symptoms of low complexity. She complains of chronic low back and BLE pain w/ hx of lumbar fusion in mid 2000's. She demonstrates deficits in trunk/hip mobility, core/hip strength which limits her tolerance to housework and other daily activities. She would benefit from skilled PT services to address deficits, decrease pn, maximize strength and function.  Barriers to therapy: chronicity of condition  Prognosis: good  Functional Limitations: carrying objects, lifting, walking, pulling,  pushing, uncomfortable because of pain, sitting, standing and stooping  Goals  Plan Goals: STG 4 wks  1) Pt to be compliant w/ initial HEP for ROM, strength and symptom mgmt.  2) Pt to report at least a 40% improvement in symptoms.  3) Pt to improve B hip ER, extn strength to 4+/5.  4) Pt to improve Mod Oswestry score to 24/50 or better to reflect improved pn and function.    LTG 8 wks  1) Pt to be independent w/ long term HEP and self mgmt.  2) Pt to report at least a 60% improvement in symptoms.  3) Pt to tolerate doing light to moderate housework for 1 hour or more w/ pn no greater than 4/10.  4) Pt to improve Mod Oswestry score to 18/50 or better to reflect improved pn and function.    Plan  Therapy options: will be seen for skilled physical therapy services  Planned modality interventions: TENS and thermotherapy (hydrocollator packs)  Planned therapy interventions: abdominal trunk stabilization, ADL retraining, body mechanics training, flexibility, functional ROM exercises, home exercise program, joint mobilization, manual therapy, neuromuscular re-education, postural training, soft tissue mobilization, spinal/joint mobilization, strengthening, stretching and therapeutic activities  Frequency: 1x week  Duration in visits: 20  Treatment plan discussed with: patient  Plan details: PT POC to emphasize core stabilization, hip strengthening, flexibility, body mechanics training utilizing therex, manual therapy techniques, modalities as indicated for pn (w/ exception of traction/US due to hardware).        Timed:  Manual Therapy:    0     mins  29723;  Therapeutic Exercise:    0     mins  51354;     Neuromuscular Saida:    0    mins  32992;    Therapeutic Activity:     0     mins  34338;     Gait Trainin     mins  10479;     Ultrasound:     0     mins  67369;    Electrical Stimulation:    0     mins  70006 ( );    Untimed:  Electrical Stimulation:    0     mins  07260 ( );  Mechanical Traction:     0     mins  82776;     Timed Treatment:   0   mins   Total Treatment:     40   mins    PT SIGNATURE: Sheree Menjivar, PT   DATE TREATMENT INITIATED: 12/18/2019    Initial Certification  Certification Period: 3/17/2020  I certify that the therapy services are furnished while this patient is under my care.  The services outlined above are required by this patient, and will be reviewed every 90 days.     PHYSICIAN:       DATE:     Please sign and return via fax to [phone].. Thank you, [facility].

## 2020-01-03 ENCOUNTER — TREATMENT (OUTPATIENT)
Dept: PHYSICAL THERAPY | Facility: CLINIC | Age: 45
End: 2020-01-03

## 2020-01-03 DIAGNOSIS — G89.29 CHRONIC BILATERAL LOW BACK PAIN WITH BILATERAL SCIATICA: Primary | ICD-10-CM

## 2020-01-03 DIAGNOSIS — M54.42 CHRONIC BILATERAL LOW BACK PAIN WITH BILATERAL SCIATICA: Primary | ICD-10-CM

## 2020-01-03 DIAGNOSIS — M54.41 CHRONIC BILATERAL LOW BACK PAIN WITH BILATERAL SCIATICA: Primary | ICD-10-CM

## 2020-01-03 DIAGNOSIS — Z78.9 DECREASED ACTIVITIES OF DAILY LIVING (ADL): ICD-10-CM

## 2020-01-03 PROCEDURE — 97110 THERAPEUTIC EXERCISES: CPT | Performed by: PHYSICAL THERAPIST

## 2020-01-03 NOTE — PROGRESS NOTES
Physical Therapy Re-certification Note  Visit: 2      Patient: Elizabeth Lo   : 1975  Referring practitioner: Daja Ortiz MD  Visit Diagnosis:     ICD-10-CM ICD-9-CM   1. Chronic bilateral low back pain with bilateral sciatica M54.42 724.2    M54.41 724.3    G89.29 338.29   2. Decreased activities of daily living (ADL) Z78.9 V49.89     Date of Initial Visit: Type: THERAPY  Noted: 2019  Today's Date: 1/3/2020        Subjective     Elizabeth Lo reports: Pt states that things have been very busy and admits that she has only worked on HEP 2x since her evaluation 2 weeks ago. Aching more today w/ the cold and rainy weather.     Treatment  Pre Treatment Pn Score: 5  Post Treatment Pn Score:  5    Exercise 1  Exercise Name 1: PPT  Sets/Reps 1: 2x10  Exercise 2  Exercise Name 2: bridges  Sets/Reps 2: 10  Exercise 3  Exercise Name 3: standing quad stretch  Sets/Reps 3: 3  Time 3: 15 sec  Exercise 4  Exercise Name 4: seated piriformis stretch  Sets/Reps 4: 3  Time 4: 15 sec  Exercise 5  Exercise Name 5: Reassessment             Objective       Active Range of Motion   Left Hip   External rotation (90/90): 42 degrees   Internal rotation (90/90): 15 degrees     Right Hip   External rotation (90/90): 45 degrees   Internal rotation (90/90): 20 degrees     Additional Active Range of Motion Details  Trunk Flxn fingertips to ankles with pn  Trunk Extn 25% with pn, bends knees  Trunk Rotation L 100%  R 100% with pn  Trunk SB L fingertips to KJL   R fingertips to superior patella with pn          Strength/Myotome Testing     Left Hip   Planes of Motion   Flexion: 4+  Extension: 4-  Abduction: 5  External rotation: 4    Right Hip   Planes of Motion   Flexion: 4+  Extension: 4-  Abduction: 5  External rotation: 4-    Left Knee   Flexion: 5  Extension: 4+    Right Knee   Flexion: 5  Extension: 5    Left Ankle/Foot   Dorsiflexion: 4+  Plantar flexion: 5    Right Ankle/Foot   Dorsiflexion: 4+  Plantar  flexion: 5    Muscle Activation   Patient unable to activate left transverse abdominals, left multifidus, right transverse abdominals and right multifidus.     Tests     Lumbar     Left   Positive quadrant.   Negative passive SLR.     Right   Positive quadrant.   Negative passive SLR.          Assessment & Plan     Assessment  Impairments: abnormal or restricted ROM, activity intolerance, impaired physical strength, lacks appropriate home exercise program and pain with function  Assessment details: Pt returns today for first follow up visit. She continues to demonstrate deficits in trunk/hip mobility, core/hip strength which limits her tolerance to housework and other daily activities. She would benefit from skilled PT services to address deficits, decrease pn, maximize strength and function. Pt admittedly noncompliant w/ HEP. Reviewed previous exercises, providing cues for correct technique w/ PPT, namely avoiding pushing through feet and substituting w/ rectus abdominis. Pt able to teach back HEP appropriately by end of visit. No additional changes made.  Barriers to therapy: chronicity of condition  Prognosis: good  Functional Limitations: carrying objects, lifting, walking, pulling, pushing, uncomfortable because of pain, sitting, standing and stooping  Goals  Plan Goals: STG 4 wks  1) Pt to be compliant w/ initial HEP for ROM, strength and symptom mgmt.-ONGOING  2) Pt to report at least a 40% improvement in symptoms.-ONGOING  3) Pt to improve B hip ER, extn strength to 4+/5.-ONGOING  4) Pt to improve Mod Oswestry score to 24/50 or better to reflect improved pn and function.-ONGOING    LTG 8 wks  1) Pt to be independent w/ long term HEP and self mgmt.-ONGOING  2) Pt to report at least a 60% improvement in symptoms.-ONGOING  3) Pt to tolerate doing light to moderate housework for 1 hour or more w/ pn no greater than 4/10.-ONGOING  4) Pt to improve Mod Oswestry score to 18/50 or better to reflect improved pn and  function.-ONGOING    Plan  Therapy options: will be seen for skilled physical therapy services  Planned modality interventions: TENS and thermotherapy (hydrocollator packs)  Planned therapy interventions: abdominal trunk stabilization, ADL retraining, body mechanics training, flexibility, functional ROM exercises, home exercise program, joint mobilization, manual therapy, neuromuscular re-education, postural training, soft tissue mobilization, spinal/joint mobilization, strengthening, stretching and therapeutic activities  Frequency: 1x week  Duration in visits: 20  Treatment plan discussed with: patient  Plan details: PT POC to emphasize core stabilization, hip strengthening, flexibility, body mechanics training utilizing therex, manual therapy techniques, modalities as indicated for pn (w/ exception of traction/US due to hardware).               Timed:  Manual Therapy:    0     mins  24686;  Therapeutic Exercise:    16     mins  64575;     Neuromuscular Saida:    0    mins  05833;    Therapeutic Activity:     0     mins  49294;     Gait Trainin     mins  06314;     Ultrasound:     0     mins  15511;    Electrical Stimulation:    0     mins  36860 ( );    Untimed:  Electrical Stimulation:    0     mins  92637 ( );  Mechanical Traction:    0     mins  98150;     Timed Treatment:   16   mins   Total Treatment:     18   mins      Sheree Menjivar, PT  Physical Therapist

## 2020-01-10 ENCOUNTER — TREATMENT (OUTPATIENT)
Dept: PHYSICAL THERAPY | Facility: CLINIC | Age: 45
End: 2020-01-10

## 2020-01-10 DIAGNOSIS — M54.42 CHRONIC BILATERAL LOW BACK PAIN WITH BILATERAL SCIATICA: Primary | ICD-10-CM

## 2020-01-10 DIAGNOSIS — G89.29 CHRONIC BILATERAL LOW BACK PAIN WITH BILATERAL SCIATICA: Primary | ICD-10-CM

## 2020-01-10 DIAGNOSIS — Z78.9 DECREASED ACTIVITIES OF DAILY LIVING (ADL): ICD-10-CM

## 2020-01-10 DIAGNOSIS — M54.41 CHRONIC BILATERAL LOW BACK PAIN WITH BILATERAL SCIATICA: Primary | ICD-10-CM

## 2020-01-10 PROCEDURE — 97110 THERAPEUTIC EXERCISES: CPT | Performed by: PHYSICAL THERAPIST

## 2020-01-10 NOTE — PROGRESS NOTES
Physical Therapy Daily Progress Note  Visit: 3      Patient: Elizabeth Lo   : 1975  Referring practitioner: Daja Ortiz MD  Visit Diagnosis:     ICD-10-CM ICD-9-CM   1. Chronic bilateral low back pain with bilateral sciatica M54.42 724.2    M54.41 724.3    G89.29 338.29   2. Decreased activities of daily living (ADL) Z78.9 V49.89     Date of Initial Visit: Type: THERAPY  Noted: 2019  Today's Date: 1/10/2020        Subjective     Elizabeth Lo reports: Pt states that her pn seems to have increased recently though she is unsure why. Denies any change in her activity. States she has been doing exercises more, usually once a day. Reports mild increase in pn after her exercises, though none in particular seem to hurt while she performs them.      Treatment  Pre Treatment Pn Score: 5  Post Treatment Pn Score:  6    Exercise 1  Exercise Name 1: DKTC stretch  Sets/Reps 1: 3  Time: 10 sec  Exercise 2  Exercise Name 2: cat/camel stretch  Sets/Reps 2: 5 ea  Exercise 3  Exercise Name 3: prayer stretch  Sets/Reps 3: 3  Time 3: 15 sec  Exercise 4  Exercise Name 4: PPT  Sets/Reps 4: 20  Exercise 5  Exercise Name 5: BKLL  Sets/Reps 5: 10  Exercise 6  Exercise Name 6: Bridges  Sets/Reps 6: 2x10             Objective       Assessment & Plan     Assessment  Assessment details: Pt reports recent exacerbation of her lower back pn but unable to recall change in activity. She has been more compliant w/ her HEP and denies pn w/ any specific exercise, but reports mild increase in pn by end of exercise regimen. She demonstrates appropriate technique w/ pelvic tilt exercises. Initiated low back/trunk stretches today and added them to HEP.    Plan  Plan details: Assess response to addition of stretches, progress as indicated.               Timed:  Manual Therapy:    0     mins  49222;  Therapeutic Exercise:    24     mins  12552;     Neuromuscular Saida:    0    mins  60678;    Therapeutic Activity:     0      mins  02268;     Gait Trainin     mins  88991;     Ultrasound:     0     mins  07322;    Electrical Stimulation:    0     mins  65245 ( );    Untimed:  Electrical Stimulation:    0     mins  91583 ( );  Mechanical Traction:    0     mins  31937;     Timed Treatment:   24   mins   Total Treatment:     26   mins      Sheree Menjivar, PT  Physical Therapist

## 2020-01-24 ENCOUNTER — TREATMENT (OUTPATIENT)
Dept: PHYSICAL THERAPY | Facility: CLINIC | Age: 45
End: 2020-01-24

## 2020-01-24 ENCOUNTER — HOSPITAL ENCOUNTER (OUTPATIENT)
Dept: MAMMOGRAPHY | Facility: HOSPITAL | Age: 45
Discharge: HOME | End: 2020-01-24
Admitting: FAMILY MEDICINE

## 2020-01-24 DIAGNOSIS — Z12.31 BREAST CANCER SCREENING BY MAMMOGRAM: ICD-10-CM

## 2020-01-24 DIAGNOSIS — M54.42 CHRONIC BILATERAL LOW BACK PAIN WITH BILATERAL SCIATICA: Primary | ICD-10-CM

## 2020-01-24 DIAGNOSIS — M54.41 CHRONIC BILATERAL LOW BACK PAIN WITH BILATERAL SCIATICA: Primary | ICD-10-CM

## 2020-01-24 DIAGNOSIS — G89.29 CHRONIC BILATERAL LOW BACK PAIN WITH BILATERAL SCIATICA: Primary | ICD-10-CM

## 2020-01-24 DIAGNOSIS — Z78.9 DECREASED ACTIVITIES OF DAILY LIVING (ADL): ICD-10-CM

## 2020-01-24 PROCEDURE — 77063 BREAST TOMOSYNTHESIS BI: CPT

## 2020-01-24 PROCEDURE — 77063 BREAST TOMOSYNTHESIS BI: CPT | Performed by: RADIOLOGY

## 2020-01-24 PROCEDURE — 77067 SCR MAMMO BI INCL CAD: CPT

## 2020-01-24 PROCEDURE — 77067 SCR MAMMO BI INCL CAD: CPT | Performed by: RADIOLOGY

## 2020-01-24 PROCEDURE — 97110 THERAPEUTIC EXERCISES: CPT | Performed by: PHYSICAL THERAPIST

## 2020-01-24 NOTE — PROGRESS NOTES
Re-Assessment / Re-Certification      Patient: Elizabeth Lo   : 1975  Diagnosis/ICD-10 Code:  Chronic bilateral low back pain with bilateral sciatica [M54.42, M54.41, G89.29]  Referring practitioner: Daja Ortiz MD  Date of Initial Visit: Type: THERAPY  Noted: 2019  Today's Date: 2020  Patient seen for 4 sessions      Subjective:   Subjective Questionnaire: Oswestry: 50  Clinical Progress: worse  Home Program Compliance: partial  Treatment has included: therapeutic exercise    Subjective    Elizabeth Lo reports: Pt states that she had epidurals a couple of weeks but has been in a lot more pn ever since. Says that last week she did nothing as far as activity/exercise. Has worked on new stretches only one or two times.     Pre tx pn score: 6  Post tx pn score: 6      Treatment  Exercise 1  Exercise Name 1: DKTC stretch  Sets/Reps 1: 3  Time: 10 sec  Exercise 2  Exercise Name 2: cat/camel stretch  Sets/Reps 2: 5 ea  Exercise 3  Exercise Name 3: prayer stretch  Sets/Reps 3: 3  Time 3: 15 sec  Exercise 4  Exercise Name 4: PPT  Sets/Reps 4: 20  Exercise 5  Exercise Name 5: Reassessment                   Objective       Active Range of Motion   Left Hip   External rotation (90/90): 42 degrees   Internal rotation (90/90): 15 degrees     Right Hip   External rotation (90/90): 45 degrees   Internal rotation (90/90): 20 degrees     Additional Active Range of Motion Details  Trunk Flxn fingertips to ankles without increase in pn  Trunk Extn 25% with pn, bends knees  Trunk Rotation L 100%  R 100% with pn  Trunk SB L fingertips 1 inch past KJL   R fingertips 1 inch past KJL with pn          Strength/Myotome Testing     Left Hip   Planes of Motion   Flexion: 5  Extension: 4  Abduction: 5  External rotation: 4-    Right Hip   Planes of Motion   Flexion: 5  Extension: 4  Abduction: 5  External rotation: 4-    Left Knee   Flexion: 5  Extension: 5    Right Knee   Flexion: 5  Extension: 5    Left  Ankle/Foot   Dorsiflexion: 4+  Plantar flexion: 5    Right Ankle/Foot   Dorsiflexion: 4+  Plantar flexion: 5    Muscle Activation   Patient able to activate left transverse abdominals and right transverse abdominals.   Patient unable to activate left multifidus and right multifidus.     Tests     Lumbar     Left   Positive quadrant.   Negative passive SLR.     Right   Positive quadrant.   Negative passive SLR.          Assessment & Plan     Assessment  Impairments: abnormal or restricted ROM, activity intolerance, impaired physical strength, lacks appropriate home exercise program and pain with function  Assessment details: Reassessment performed today. Pt had completed evaluation and 2 follow ups prior to today's visit. She has reported worsening of her pn w/ her initial HEP and then again after recently receiving epidural injections. She denies significant improvement in function, but does demonstrate increased hip strength, improved activation of core stabilizers. She may benefit from continued PT services to allow gradual progression of hip/core strengthening and continued flexibility exercises.  Barriers to therapy: chronicity of condition  Prognosis: fair  Functional Limitations: carrying objects, lifting, walking, pulling, pushing, uncomfortable because of pain, sitting, standing and stooping  Goals  Plan Goals: STG 4 wks  1) Pt to be compliant w/ initial HEP for ROM, strength and symptom mgmt.-PARTIALLY MET  2) Pt to report at least a 40% improvement in symptoms.-ONGOING  3) Pt to improve B hip ER, extn strength to 4+/5.-PARTIALLY MET  4) Pt to improve Mod Oswestry score to 24/50 or better to reflect improved pn and function.-ONGOING    LTG 8 wks  1) Pt to be independent w/ long term HEP and self mgmt.-ONGOING  2) Pt to report at least a 60% improvement in symptoms.-ONGOING  3) Pt to tolerate doing light to moderate housework for 1 hour or more w/ pn no greater than 4/10.-ONGOING  4) Pt to improve Mod  Oswestry score to 18/50 or better to reflect improved pn and function.-ONGOING    Plan  Therapy options: will be seen for skilled physical therapy services  Planned modality interventions: TENS and thermotherapy (hydrocollator packs)  Planned therapy interventions: abdominal trunk stabilization, ADL retraining, body mechanics training, flexibility, functional ROM exercises, home exercise program, joint mobilization, manual therapy, neuromuscular re-education, postural training, soft tissue mobilization, spinal/joint mobilization, strengthening, stretching and therapeutic activities  Frequency: 1x week  Duration in visits: 20  Treatment plan discussed with: patient  Plan details: Cont w/ PT POC to emphasize core stabilization, hip strengthening, flexibility, body mechanics training utilizing therex, manual therapy techniques, modalities as indicated for pn (w/ exception of traction/US due to hardware).      Progress toward previous goals: Partially Met        PT Signature: Sheree Menjivar, PT        Timed:  Manual Therapy:    0     mins  01848;  Therapeutic Exercise:    24     mins  69835;     Neuromuscular Saida:    0    mins  00787;    Therapeutic Activity:     0     mins  24205;     Gait Trainin     mins  19220;     Ultrasound:     0     mins  81492;    Electrical Stimulation:    0     mins  30845 ( );    Untimed:  Electrical Stimulation:    0     mins  71983 ( );  Mechanical Traction:    0     mins  84496;     Timed Treatment:   24   mins   Total Treatment:     26   mins

## 2020-01-27 ENCOUNTER — TREATMENT (OUTPATIENT)
Dept: PHYSICAL THERAPY | Facility: CLINIC | Age: 45
End: 2020-01-27

## 2020-01-27 DIAGNOSIS — M54.41 CHRONIC BILATERAL LOW BACK PAIN WITH BILATERAL SCIATICA: Primary | ICD-10-CM

## 2020-01-27 DIAGNOSIS — G89.29 CHRONIC BILATERAL LOW BACK PAIN WITH BILATERAL SCIATICA: Primary | ICD-10-CM

## 2020-01-27 DIAGNOSIS — M54.42 CHRONIC BILATERAL LOW BACK PAIN WITH BILATERAL SCIATICA: Primary | ICD-10-CM

## 2020-01-27 DIAGNOSIS — Z78.9 DECREASED ACTIVITIES OF DAILY LIVING (ADL): ICD-10-CM

## 2020-01-27 PROCEDURE — 97110 THERAPEUTIC EXERCISES: CPT | Performed by: PHYSICAL THERAPIST

## 2020-01-27 NOTE — PROGRESS NOTES
Physical Therapy Daily Progress Note  Visit: 5      Patient: Elizabeth Meyersp   : 1975  Referring practitioner: Daja Ortiz MD  Visit Diagnosis:     ICD-10-CM ICD-9-CM   1. Chronic bilateral low back pain with bilateral sciatica M54.42 724.2    M54.41 724.3    G89.29 338.29   2. Decreased activities of daily living (ADL) Z78.9 V49.89     Date of Initial Visit: Type: THERAPY  Noted: 2019  Today's Date: 2020        Subjective     Elizabeth Meyersp reports: Pt states her pharmacy was closed yesterday so wasn't able to get a refill on her medication and is having a lot of pn today. Did not work on her HEP much over the weekend because she wasn't feeling up to it. Says she doesn't know how much she will be able to tolerate today.    Treatment  Pre Treatment Pn Score: 7  Post Treatment Pn Score:  7    Exercise 1  Exercise Name 1: PPT  Sets/Reps 1: 20  Exercise 2  Exercise Name 2: BKLL  Sets/Reps 2: 10  Exercise 3  Exercise Name 3: prayer stretch  Sets/Reps 3: 3  Time 3: 15 sec  Exercise 4  Exercise Name 4: cat/camel stretch  Sets/Reps 4: 3  Time 4: 10 sec hold  Exercise 5  Exercise Name 5: standing hip flexor stretch  Sets/Reps 5: 3  Time 5: 15 sec  Exercise 6  Exercise Name 6: standing quad stretch-foot on table  Sets/Reps 6: 3  Time 6: 15 sec  Exercise 7  Exercise Name 7: minisquats  Sets/Reps 7: 10             Objective       Assessment & Plan     Assessment  Assessment details: Pt had very limited tolerance to therex today. She required frequent rest breaks w/ even low level core stab activities due to report of spasm in her back, making exercise progression difficult today.  Again discussed importance of compliance w/ HEP to manage symptoms and build strength/flexibility. Pn unchanged by end of tx visit. No changes made to HEP.    Plan  Plan details: Proceed w/ stretching and core stab, hip strengthening as pt tolerates. May attempt prone hip extn next visit.               Timed:  Manual  Therapy:    0     mins  78205;  Therapeutic Exercise:    24     mins  50032;     Neuromuscular Saida:    0    mins  88760;    Therapeutic Activity:     0     mins  92083;     Gait Trainin     mins  72884;     Ultrasound:     0     mins  39936;    Electrical Stimulation:    0     mins  45054 ( );    Untimed:  Electrical Stimulation:    0     mins  22066 ( );  Mechanical Traction:    0     mins  32586;     Timed Treatment:   24   mins   Total Treatment:     26   mins      Sheree Menjivar, PT  Physical Therapist

## 2020-02-21 ENCOUNTER — TREATMENT (OUTPATIENT)
Dept: PHYSICAL THERAPY | Facility: CLINIC | Age: 45
End: 2020-02-21

## 2020-02-21 DIAGNOSIS — G89.29 CHRONIC BILATERAL LOW BACK PAIN WITH BILATERAL SCIATICA: Primary | ICD-10-CM

## 2020-02-21 DIAGNOSIS — Z78.9 DECREASED ACTIVITIES OF DAILY LIVING (ADL): ICD-10-CM

## 2020-02-21 DIAGNOSIS — M54.42 CHRONIC BILATERAL LOW BACK PAIN WITH BILATERAL SCIATICA: Primary | ICD-10-CM

## 2020-02-21 DIAGNOSIS — M54.41 CHRONIC BILATERAL LOW BACK PAIN WITH BILATERAL SCIATICA: Primary | ICD-10-CM

## 2020-02-21 PROCEDURE — 97110 THERAPEUTIC EXERCISES: CPT | Performed by: PHYSICAL THERAPIST

## 2020-02-21 NOTE — PROGRESS NOTES
Re-Assessment / Re-Certification      Patient: Elizabeth Lo   : 1975  Diagnosis/ICD-10 Code:  Chronic bilateral low back pain with bilateral sciatica [M54.42, M54.41, G89.29]  Referring practitioner: No ref. provider found  Date of Initial Visit: Type: THERAPY  Noted: 2019  Today's Date: 2020  Patient seen for 6 sessions      Subjective:   Subjective Questionnaire: Oswestry: 33/50  Clinical Progress: unchanged  Home Program Compliance: No  Treatment has included: therapeutic exercise    Subjective    Elizabeth Lo reports: Pt states that her back has been so/so. States that she hasn't done her exercises like she should. She states she is too busy caring for her daughter and taking care of her house to get her exercises in consistently.    Pre tx pn score: 5  Post tx pn score: 5      Treatment  Exercise 1  Exercise Name 1: Reassessment  Exercise 2  Exercise Name 2: PPT  Sets/Reps 2: 20  Exercise 3  Exercise Name 3: BKLL  Sets/Reps 3: 20  Exercise 4  Exercise Name 4: cat/camel stretch  Sets/Reps 4: 3  Time 4: 10 sec hold  Exercise 5  Exercise Name 5: prayer stretch  Sets/Reps 5: 3  Time 5: 15 sec                   Objective       Active Range of Motion   Left Hip   External rotation (90/90): 42 degrees   Internal rotation (90/90): 15 degrees     Right Hip   External rotation (90/90): 45 degrees   Internal rotation (90/90): 20 degrees     Additional Active Range of Motion Details  Trunk Flxn fingertips to ankles without increase in pn  Trunk Extn 25% with pn, bends knees  Trunk Rotation L 100%  R 100% with pn  Trunk SB L fingertips 1 inch past KJL   R fingertips 1 inch past KJL with pn          Strength/Myotome Testing     Left Hip   Planes of Motion   Flexion: 5  Extension: 4-  Abduction: 5  External rotation: 4-    Right Hip   Planes of Motion   Flexion: 5  Extension: 4-  Abduction: 5  External rotation: 4-    Left Knee   Flexion: 5  Extension: 5    Right Knee   Flexion: 5  Extension: 5    Left  Ankle/Foot   Dorsiflexion: 4+  Plantar flexion: 5    Right Ankle/Foot   Dorsiflexion: 4+  Plantar flexion: 5    Muscle Activation   Patient able to activate left transverse abdominals and right transverse abdominals.   Patient unable to activate left multifidus and right multifidus.     Tests     Lumbar     Left   Positive quadrant.   Negative passive SLR.     Right   Positive quadrant.   Negative passive SLR.          Assessment & Plan     Assessment  Impairments: abnormal or restricted ROM, activity intolerance, impaired physical strength, lacks appropriate home exercise program and pain with function  Assessment details: Reassessment performed today. Pt has completed 6 PT visits to date. She is admittedly non compliant w/ her HEP despite multiple discussions regarding importance of completing her exercises for optimal outcomes.  Core/hip strength unchanged, no observable progress made toward PT goals. Pt apologetic about attendance rate/HEP compliance, asks to continue PT and agrees she will put forth more effort to get her exercises in.  Barriers to therapy: chronicity of condition  Prognosis: fair  Functional Limitations: carrying objects, lifting, walking, pulling, pushing, uncomfortable because of pain, sitting, standing and stooping  Goals  Plan Goals: STG 4 wks  1) Pt to be compliant w/ initial HEP for ROM, strength and symptom mgmt.-ONGOING  2) Pt to report at least a 40% improvement in symptoms.-ONGOING  3) Pt to improve B hip ER, extn strength to 4+/5.-ONGOING  4) Pt to improve Mod Oswestry score to 24/50 or better to reflect improved pn and function.-ONGOING    LTG 8 wks  1) Pt to be independent w/ long term HEP and self mgmt.-ONGOING  2) Pt to report at least a 60% improvement in symptoms.-ONGOING  3) Pt to tolerate doing light to moderate housework for 1 hour or more w/ pn no greater than 4/10.-ONGOING  4) Pt to improve Mod Oswestry score to 18/50 or better to reflect improved pn and  function.-ONGOING    Plan  Therapy options: will be seen for skilled physical therapy services  Planned modality interventions: TENS and thermotherapy (hydrocollator packs)  Planned therapy interventions: abdominal trunk stabilization, ADL retraining, body mechanics training, flexibility, functional ROM exercises, home exercise program, joint mobilization, manual therapy, neuromuscular re-education, postural training, soft tissue mobilization, spinal/joint mobilization, strengthening, stretching and therapeutic activities  Frequency: 1x week  Duration in visits: 20  Treatment plan discussed with: patient  Plan details: Cont w/ PT POC to emphasize core stabilization, hip strengthening, flexibility, body mechanics training utilizing therex, manual therapy techniques, modalities as indicated for pn (w/ exception of traction/US due to hardware). If HEP non compliance continues will plan to discharge pt from PT services.       Progress toward previous goals: Not Met        PT Signature: Sheree Menjivar, PT        Timed:  Manual Therapy:    0     mins  13429;  Therapeutic Exercise:    28     mins  52816;     Neuromuscular Saida:    0    mins  26470;    Therapeutic Activity:     0     mins  57885;     Gait Trainin     mins  88536;     Ultrasound:     0     mins  07238;    Electrical Stimulation:    0     mins  58203 ( );    Untimed:  Electrical Stimulation:    0     mins  00354 ( );  Mechanical Traction:    0     mins  04896;     Timed Treatment:   28   mins   Total Treatment:     30   mins

## 2020-02-25 RX ORDER — DULOXETIN HYDROCHLORIDE 60 MG/1
60 CAPSULE, DELAYED RELEASE ORAL DAILY
Qty: 30 CAPSULE | Refills: 2 | Status: SHIPPED | OUTPATIENT
Start: 2020-02-25 | End: 2020-09-04

## 2020-02-28 ENCOUNTER — TREATMENT (OUTPATIENT)
Dept: PHYSICAL THERAPY | Facility: CLINIC | Age: 45
End: 2020-02-28

## 2020-02-28 DIAGNOSIS — G89.29 CHRONIC BILATERAL LOW BACK PAIN WITH BILATERAL SCIATICA: Primary | ICD-10-CM

## 2020-02-28 DIAGNOSIS — Z78.9 DECREASED ACTIVITIES OF DAILY LIVING (ADL): ICD-10-CM

## 2020-02-28 DIAGNOSIS — M54.42 CHRONIC BILATERAL LOW BACK PAIN WITH BILATERAL SCIATICA: Primary | ICD-10-CM

## 2020-02-28 DIAGNOSIS — M54.41 CHRONIC BILATERAL LOW BACK PAIN WITH BILATERAL SCIATICA: Primary | ICD-10-CM

## 2020-02-28 PROCEDURE — 97110 THERAPEUTIC EXERCISES: CPT | Performed by: PHYSICAL THERAPIST

## 2020-02-28 NOTE — PROGRESS NOTES
Physical Therapy Daily Progress Note  Visit: 7      Patient: Elizabeth Lo   : 1975  Referring practitioner: No ref. provider found  Visit Diagnosis:     ICD-10-CM ICD-9-CM   1. Chronic bilateral low back pain with bilateral sciatica M54.42 724.2    M54.41 724.3    G89.29 338.29   2. Decreased activities of daily living (ADL) Z78.9 V49.89     Date of Initial Visit: Type: THERAPY  Noted: 2019  Today's Date: 2020        Subjective     Elizabeth Lo reports: Pt states that her pn is the same. She got her exercises in almost every day this week. She wakes stiff and painful and it eases a little as she moves around but pn is still constant.     Treatment  Pre Treatment Pn Score: 5  Post Treatment Pn Score:  5    Exercise 1  Exercise Name 1: PPT  Sets/Reps 1: 30  Exercise 2  Exercise Name 2: BKLL  Sets/Reps 2: 30  Exercise 3  Exercise Name 3: LE glides  Sets/Reps 3: 15  Exercise 4  Exercise Name 4: bridges  Sets/Reps 4: 30  Exercise 5  Exercise Name 5: clamshells  Sets/Reps 5: 20  Exercise 6  Exercise Name 6: SL hip abd  Sets/Reps 6: 20  Exercise 7  Exercise Name 7: LTR  Sets/Reps 7: 10  Exercise 8  Exercise Name 8: cat/camel  Sets/Reps 8: 10  Exercise 9  Exercise Name 9: DKTC stretch  Sets/Reps 9: 3  Time 9: 15 sec             Objective       Assessment & Plan     Assessment  Assessment details: Pt more compliant w/ HEP. Continued today's visit w/ focus on core stabilization and hip strengthening. Pt fatigues very quickly, reports cramping in hip w/ sidelying abduction and clamshells and requires frequent rest breaks. However she was able to tolerate core stab progressions and hip exercises w/o report of increased pn. Added clamshells, hip abd, and LE glides w/ PPT to HEP.    Plan  Plan details: Assess response to HEP additions and continue as pt tolerates. May add paloff presses next visit, modified side planks.               Timed:  Manual Therapy:    0     mins  82682;  Therapeutic Exercise:     40     mins  48325;     Neuromuscular Saida:    0    mins  12060;    Therapeutic Activity:     0     mins  82597;     Gait Trainin     mins  74284;     Ultrasound:     0     mins  85108;    Electrical Stimulation:    0     mins  68964 ( );    Untimed:  Electrical Stimulation:    0     mins  74939 ( );  Mechanical Traction:    0     mins  78239;     Timed Treatment:   40   mins   Total Treatment:     43   mins      Sheree Menjivar, PT  Physical Therapist

## 2020-03-06 ENCOUNTER — TREATMENT (OUTPATIENT)
Dept: PHYSICAL THERAPY | Facility: CLINIC | Age: 45
End: 2020-03-06

## 2020-03-06 DIAGNOSIS — M54.41 CHRONIC BILATERAL LOW BACK PAIN WITH BILATERAL SCIATICA: Primary | ICD-10-CM

## 2020-03-06 DIAGNOSIS — G89.29 CHRONIC BILATERAL LOW BACK PAIN WITH BILATERAL SCIATICA: Primary | ICD-10-CM

## 2020-03-06 DIAGNOSIS — M54.42 CHRONIC BILATERAL LOW BACK PAIN WITH BILATERAL SCIATICA: Primary | ICD-10-CM

## 2020-03-06 DIAGNOSIS — Z78.9 DECREASED ACTIVITIES OF DAILY LIVING (ADL): ICD-10-CM

## 2020-03-06 PROCEDURE — 97110 THERAPEUTIC EXERCISES: CPT | Performed by: PHYSICAL THERAPIST

## 2020-03-06 NOTE — PROGRESS NOTES
Physical Therapy Daily Progress Note  Visit: 8      Patient: Elizabeth Lo   : 1975  Referring practitioner: No ref. provider found  Visit Diagnosis:     ICD-10-CM ICD-9-CM   1. Chronic bilateral low back pain with bilateral sciatica M54.42 724.2    M54.41 724.3    G89.29 338.29   2. Decreased activities of daily living (ADL) Z78.9 V49.89     Date of Initial Visit: Type: THERAPY  Noted: 2019  Today's Date: 3/6/2020        Subjective     Elizabeth Meyersp reports: Pt states she has been having a lot of cramping, so has been taking her muscle relaxer before doing her HEP which seems to help. HEP additions going ok.    Treatment  Pre Treatment Pn Score: 5  Post Treatment Pn Score:  6    Exercise 1  Exercise Name 1: bicycles  Sets/Reps 1: 30  Exercise 2  Exercise Name 2: bridges  Sets/Reps 2: 30  Exercise 3  Exercise Name 3: clamshells  Sets/Reps 3: 30  Exercise 4  Exercise Name 4: SL hip abd  Sets/Reps 4: 30  Exercise 5  Exercise Name 5: LTR  Sets/Reps 5: 10  Exercise 6  Exercise Name 6: birddog  Sets/Reps 6: 10  Exercise 7  Exercise Name 7: paloff presses  Equipment/Resistance 7: RTB  Sets/Reps 7: 10 B  Exercise 8  Exercise Name 8: cat/camel  Sets/Reps 8: 10  Exercise 9  Exercise Name 9: DKTC stretch  Sets/Reps 9: 3  Time 9: 15 sec             Objective       Assessment & Plan     Assessment  Assessment details: Pt continues to report improved compliance w/ her HEP, and is tolerating exercise progressions well. Initiated bird dogs and transitioned to core stab in standing w/ paloff presses. Pt challenged but able to tolerate in low repetition. Pn only slightly improved by end of visit. No changes made to HEP but discussed progressing reps as tolerated.    Plan  Plan details: Continue w/ core stab progressions as tolerated- add TB to clams and standing hip abd               Timed:  Manual Therapy:    0     mins  02397;  Therapeutic Exercise:    38     mins  82533;     Neuromuscular Saida:    0    mins   80626;    Therapeutic Activity:     0     mins  22249;     Gait Trainin     mins  56654;     Ultrasound:     0     mins  89019;    Electrical Stimulation:    0     mins  47982 ( );    Untimed:  Electrical Stimulation:    0     mins  66632 ( );  Mechanical Traction:    0     mins  19550;     Timed Treatment:   38   mins   Total Treatment:     40   mins      Sheree Menjivar, PT  Physical Therapist

## 2020-03-09 ENCOUNTER — OFFICE VISIT (OUTPATIENT)
Dept: FAMILY MEDICINE CLINIC | Facility: CLINIC | Age: 45
End: 2020-03-09

## 2020-03-09 VITALS
HEIGHT: 66 IN | SYSTOLIC BLOOD PRESSURE: 110 MMHG | TEMPERATURE: 98 F | WEIGHT: 181.4 LBS | HEART RATE: 105 BPM | BODY MASS INDEX: 29.15 KG/M2 | RESPIRATION RATE: 16 BRPM | OXYGEN SATURATION: 98 % | DIASTOLIC BLOOD PRESSURE: 70 MMHG

## 2020-03-09 DIAGNOSIS — Z00.00 WELL ADULT EXAM: Primary | ICD-10-CM

## 2020-03-09 PROBLEM — G89.4 CHRONIC PAIN DISORDER: Status: ACTIVE | Noted: 2019-10-30

## 2020-03-09 PROBLEM — M96.1 LUMBAR POST-LAMINECTOMY SYNDROME: Status: ACTIVE | Noted: 2019-10-30

## 2020-03-09 PROCEDURE — 99396 PREV VISIT EST AGE 40-64: CPT | Performed by: FAMILY MEDICINE

## 2020-03-09 PROCEDURE — 90471 IMMUNIZATION ADMIN: CPT | Performed by: FAMILY MEDICINE

## 2020-03-09 PROCEDURE — 90715 TDAP VACCINE 7 YRS/> IM: CPT | Performed by: FAMILY MEDICINE

## 2020-03-09 RX ORDER — ERGOCALCIFEROL 1.25 MG/1
50000 CAPSULE ORAL WEEKLY
Qty: 12 CAPSULE | Refills: 0 | Status: SHIPPED | OUTPATIENT
Start: 2020-03-09 | End: 2020-06-03 | Stop reason: SDUPTHER

## 2020-03-09 RX ORDER — GABAPENTIN 400 MG/1
400 CAPSULE ORAL 3 TIMES DAILY
COMMUNITY
End: 2021-11-30

## 2020-03-09 NOTE — PROGRESS NOTES
"Subjective   Elizabeth Lo is a 44 y.o. female and is here for a comprehensive physical exam. The patient reports problems - Chronic back pain.      The following portions of the patient's history were reviewed and updated as appropriate: allergies, current medications, past family history, past medical history, past social history, past surgical history and problem list.    No Known Allergies    Past Medical History:   Diagnosis Date   • Acute bronchitis    • Epidermal inclusion cyst    • Pustule    • Sinusitis    • Stomatitis      Past Surgical History:   Procedure Laterality Date   • CARPAL TUNNEL RELEASE Bilateral 2015   • LUMBAR FUSION  2007     Family History   Problem Relation Age of Onset   • Arthritis Mother    • Diabetes Father    • Heart disease Maternal Grandmother    • Breast cancer Neg Hx      Social History     Socioeconomic History   • Marital status: Single     Spouse name: Not on file   • Number of children: Not on file   • Years of education: Not on file   • Highest education level: Not on file   Tobacco Use   • Smoking status: Current Every Day Smoker     Packs/day: 0.75     Years: 20.00     Pack years: 15.00     Types: Cigarettes   • Smokeless tobacco: Never Used   Substance and Sexual Activity   • Alcohol use: No     Comment: SOCIAL    • Drug use: No         Objective     Review of Systems    /70   Pulse 105   Temp 98 °F (36.7 °C)   Resp 16   Ht 167.6 cm (66\")   Wt 82.3 kg (181 lb 6.4 oz)   SpO2 98%   BMI 29.28 kg/m²     Review of Systems   Constitutional: Negative for activity change and unexpected weight change.   HENT: Negative for congestion and sore throat.    Eyes: Negative for pain and visual disturbance.   Respiratory: Negative for cough and shortness of breath.    Cardiovascular: Negative for chest pain, palpitations and leg swelling.   Gastrointestinal: Negative for diarrhea, nausea and vomiting.   Endocrine: Negative for cold intolerance and heat intolerance. "   Genitourinary: Positive for dyspareunia. Negative for dysuria and hematuria.   Musculoskeletal: Negative for arthralgias and joint swelling.   Skin: Negative for color change and rash.   Allergic/Immunologic: Negative for environmental allergies and food allergies.   Neurological: Negative for syncope and headaches.   Hematological: Negative for adenopathy. Does not bruise/bleed easily.   Psychiatric/Behavioral: Negative for dysphoric mood and sleep disturbance. The patient is not nervous/anxious.            Physical Exam     Physical Exam   Constitutional: She is oriented to person, place, and time. She appears well-developed and well-nourished.   HENT:   Head: Normocephalic and atraumatic.   Right Ear: Tympanic membrane and external ear normal.   Left Ear: Tympanic membrane and external ear normal.   Nose: Nose normal. No mucosal edema, sinus tenderness or septal deviation. Right sinus exhibits no maxillary sinus tenderness and no frontal sinus tenderness. Left sinus exhibits no maxillary sinus tenderness and no frontal sinus tenderness.   Mouth/Throat: Oropharynx is clear and moist and mucous membranes are normal.   Eyes: Pupils are equal, round, and reactive to light. Conjunctivae and EOM are normal.   Neck: Normal range of motion. Neck supple. No JVD present. Carotid bruit is not present. No thyromegaly present.   Cardiovascular: Normal rate, regular rhythm, S1 normal, S2 normal, normal heart sounds and intact distal pulses. Exam reveals no gallop.   No murmur heard.  Pulmonary/Chest: Effort normal and breath sounds normal. No respiratory distress. She has no wheezes.   Abdominal: Soft. Bowel sounds are normal. She exhibits no mass. There is no tenderness.   Genitourinary: Rectum normal and vagina normal. Rectal exam shows no mass, no tenderness and guaiac negative stool. Pelvic exam was performed with patient prone. There is no tenderness or lesion on the right labia. There is no tenderness or lesion on the  left labia. Cervix exhibits no discharge. No tenderness in the vagina. No vaginal discharge found.   Genitourinary Comments: Breast exam is normal no masses no skin changes no axillary lymphadenopathy  Iliac exam normal BUS cervix normal adnexa negative rectal confirmatory.   Musculoskeletal: Normal range of motion. She exhibits tenderness. She exhibits no edema.   Chronic lower lumbar pain   Lymphadenopathy:     She has no cervical adenopathy.     She has no axillary adenopathy.        Right: No supraclavicular adenopathy present.        Left: No supraclavicular adenopathy present.   Neurological: She is alert and oriented to person, place, and time. She has normal strength and normal reflexes. No cranial nerve deficit or sensory deficit.   Skin: Skin is warm and dry. No pallor.   Psychiatric: She has a normal mood and affect. Her speech is normal and behavior is normal. Judgment and thought content normal.   Nursing note and vitals reviewed.        Assessment/Plan   Healthy female exam.     1.   Patient Active Problem List   Diagnosis   • Chronic bilateral low back pain with bilateral sciatica   • Anxiety, generalized   • Carpal tunnel syndrome   • Chronic pain disorder   • Lumbar post-laminectomy syndrome     2. Patient Counseling:  --Nutrition: Stressed importance of moderation in sodium/caffeine intake, saturated fat and cholesterol, caloric balance, sufficient intake of fresh fruits, vegetables, fiber, calcium, iron, and 1 mg of folate supplement per day (for females capable of pregnancy).  --Discussed the issue of estrogen replacement, calcium supplement, and the daily use of baby aspirin.  --Exercise: Stressed the importance of regular exercise.   --Substance Abuse: Discussed cessation/primary prevention of tobacco, alcohol, or other drug use; driving or other dangerous activities under the influence; availability of treatment for abuse.    --Sexuality: Discussed sexually transmitted diseases, partner  selection, use of condoms, avoidance of unintended pregnancy  and contraceptive alternatives.   --Injury prevention: Discussed safety belts, safety helmets, smoke detector, smoking near bedding or upholstery.   --Dental health: Discussed importance of regular tooth brushing, flossing, and dental visits.  --Immunizations reviewed.  --Discussed benefits of screening colonoscopy.  --After hours service discussed with patient    3. Discussed the patient's BMI with her.  The BMI is above average; BMI management plan is completed  4. Follow up in 6 months    See form continue physical therapy         Howard Carvajal MD  03/09/2020  5:46 PM

## 2020-03-13 ENCOUNTER — TREATMENT (OUTPATIENT)
Dept: PHYSICAL THERAPY | Facility: CLINIC | Age: 45
End: 2020-03-13

## 2020-03-13 DIAGNOSIS — M54.42 CHRONIC BILATERAL LOW BACK PAIN WITH BILATERAL SCIATICA: Primary | ICD-10-CM

## 2020-03-13 DIAGNOSIS — Z78.9 DECREASED ACTIVITIES OF DAILY LIVING (ADL): ICD-10-CM

## 2020-03-13 DIAGNOSIS — M54.41 CHRONIC BILATERAL LOW BACK PAIN WITH BILATERAL SCIATICA: Primary | ICD-10-CM

## 2020-03-13 DIAGNOSIS — G89.29 CHRONIC BILATERAL LOW BACK PAIN WITH BILATERAL SCIATICA: Primary | ICD-10-CM

## 2020-03-13 PROCEDURE — 97110 THERAPEUTIC EXERCISES: CPT | Performed by: PHYSICAL THERAPIST

## 2020-03-13 NOTE — PROGRESS NOTES
Physical Therapy Daily Progress Note  Visit: 9      Patient: Elizabeth Lo   : 1975  Referring practitioner: No ref. provider found  Visit Diagnosis:     ICD-10-CM ICD-9-CM   1. Chronic bilateral low back pain with bilateral sciatica M54.42 724.2    M54.41 724.3    G89.29 338.29   2. Decreased activities of daily living (ADL) Z78.9 V49.89     Date of Initial Visit: Type: THERAPY  Noted: 2019  Today's Date: 3/13/2020        Subjective     Elizabeth Lo reports: Pt states that she still has muscle cramping during her exercises but is able to perform them w/o much increase in pn. Her pn stays around a 5/10, but increases w/ repetitive activities around the house.    Treatment  Pre Treatment Pn Score: 5  Post Treatment Pn Score:  6    Exercise 1  Exercise Name 1: birddog  Sets/Reps 1: 20  Exercise 2  Exercise Name 2: fire hydrants  Sets/Reps 2: 15  Exercise 3  Exercise Name 3: bridges  Sets/Reps 3: 30  Exercise 4  Exercise Name 4: clamshells  Equipment/Resistance 4: RTB  Sets/Reps 4: 10  Exercise 5  Exercise Name 5: standing hip abd  Equipment/Resistance 5: RTB  Sets/Reps 5: 20  Exercise 6  Exercise Name 6: paloff presses  Equipment/Resistance 6: RTB  Sets/Reps 6: 15 ea  Exercise 7  Exercise Name 7: LTR  Sets/Reps 7: 10  Exercise 8  Exercise Name 8: prayer stretch  Sets/Reps 8: 3  Time 8: 15 sec             Objective       Assessment & Plan     Assessment  Assessment details: Pt doing well w/ therex in clinic. She was able to progress today to addition of TB to clamshells and hip abduction, initiation of fire hydrants, and is gradually able to increase reps w/ her other exercises. She reported only slight increase in her pn by end of visit. Issued TB and updated HEP to include TB clams, TB hip abd in standing, and fire hydrants.    Plan  Plan details: POC next visit. Continue w/ current POC               Timed:  Manual Therapy:    0     mins  82270;  Therapeutic Exercise:    28     mins  45759;      Neuromuscular Saida:    0    mins  97906;    Therapeutic Activity:     0     mins  12293;     Gait Trainin     mins  51726;     Ultrasound:     0     mins  57282;    Electrical Stimulation:    0     mins  98896 ( );    Untimed:  Electrical Stimulation:    0     mins  70493 ( );  Mechanical Traction:    0     mins  26710;     Timed Treatment:   28   mins   Total Treatment:     30   mins      Sheree Menjivar, PT  Physical Therapist

## 2020-06-03 ENCOUNTER — TELEPHONE (OUTPATIENT)
Dept: FAMILY MEDICINE CLINIC | Facility: CLINIC | Age: 45
End: 2020-06-03

## 2020-06-03 RX ORDER — ERGOCALCIFEROL 1.25 MG/1
50000 CAPSULE ORAL WEEKLY
Qty: 4 CAPSULE | Refills: 0 | Status: SHIPPED | OUTPATIENT
Start: 2020-06-03 | End: 2020-09-01

## 2020-06-03 RX ORDER — ERGOCALCIFEROL 1.25 MG/1
50000 CAPSULE ORAL WEEKLY
Qty: 12 CAPSULE | Refills: 0 | Status: CANCELLED | OUTPATIENT
Start: 2020-06-03 | End: 2020-09-01

## 2020-06-03 NOTE — TELEPHONE ENCOUNTER
PHARMACIST CALLED IN REQUESTING A REFILL ON ergocalciferol (DRISDOL) 1.25 MG (24956 UT) capsule      CB NUMBER: [phone]  HAYLEE: [place]  [phone]

## 2020-07-01 RX ORDER — ERGOCALCIFEROL 1.25 MG/1
CAPSULE ORAL
Qty: 4 CAPSULE | Refills: 0 | OUTPATIENT
Start: 2020-07-01

## 2020-09-02 RX ORDER — DULOXETIN HYDROCHLORIDE 60 MG/1
60 CAPSULE, DELAYED RELEASE ORAL DAILY
Qty: 30 CAPSULE | Refills: 2 | OUTPATIENT
Start: 2020-09-02

## 2020-09-02 NOTE — TELEPHONE ENCOUNTER
Caller: [facility] #[id number] - [place], [place] - [address] AT [facility] [address] - [phone] [facility] [phone] FX    Relationship: Pharmacy    Best call back number: [phone]    Medication needed:   Requested Prescriptions     Pending Prescriptions Disp Refills   • DULoxetine (CYMBALTA) 60 MG capsule 30 capsule 2     Sig: Take 1 capsule by mouth Daily.       When do you need the refill by: 9/2/20    What is the patient's preferred pharmacy: [facility] #[id number] - [place], [place] - [address] AT [facility] [address] - [phone]  - [phone] FX

## 2020-09-04 RX ORDER — DULOXETIN HYDROCHLORIDE 60 MG/1
60 CAPSULE, DELAYED RELEASE ORAL DAILY
Qty: 30 CAPSULE | Refills: 0 | Status: SHIPPED | OUTPATIENT
Start: 2020-09-04 | End: 2020-10-05

## 2020-10-05 RX ORDER — DULOXETIN HYDROCHLORIDE 60 MG/1
CAPSULE, DELAYED RELEASE ORAL
Qty: 15 CAPSULE | Refills: 0 | Status: SHIPPED | OUTPATIENT
Start: 2020-10-05 | End: 2020-10-28 | Stop reason: SDUPTHER

## 2020-10-05 NOTE — TELEPHONE ENCOUNTER
Caller: [facility] #[id number] - [place], [place] - [address] AT [facility] & [address] - [phone] [facility] [phone] FX    Relationship: Pharmacy    Best call back number: [phone]    Medication needed:   Requested Prescriptions     Pending Prescriptions Disp Refills   • DULoxetine (CYMBALTA) 60 MG capsule [Pharmacy Med Name: DULOXETINE DR 60MG CAPSULES] 30 capsule 0     Sig: TAKE 1 CAPSULE BY MOUTH DAILY       When do you need the refill by: 10-    What details did the patient provide when requesting the medication: Patient is running low on medications    Does the patient have less than a 3 day supply:  [x] Yes  [] No    What is the patient's preferred pharmacy: [facility] #[id number] - [place], [place] - [address] AT [facility] [address]  [phone] [facility] [phone] FX

## 2020-10-26 RX ORDER — DULOXETIN HYDROCHLORIDE 60 MG/1
CAPSULE, DELAYED RELEASE ORAL
Qty: 15 CAPSULE | Refills: 0 | OUTPATIENT
Start: 2020-10-26

## 2020-10-28 ENCOUNTER — DOCUMENTATION (OUTPATIENT)
Dept: PHYSICAL THERAPY | Facility: CLINIC | Age: 45
End: 2020-10-28

## 2020-10-28 ENCOUNTER — TELEMEDICINE (OUTPATIENT)
Dept: FAMILY MEDICINE CLINIC | Facility: CLINIC | Age: 45
End: 2020-10-28

## 2020-10-28 DIAGNOSIS — Z78.9 DECREASED ACTIVITIES OF DAILY LIVING (ADL): ICD-10-CM

## 2020-10-28 DIAGNOSIS — G89.29 CHRONIC BILATERAL LOW BACK PAIN WITH BILATERAL SCIATICA: Primary | ICD-10-CM

## 2020-10-28 DIAGNOSIS — M54.42 CHRONIC BILATERAL LOW BACK PAIN WITH BILATERAL SCIATICA: Primary | ICD-10-CM

## 2020-10-28 DIAGNOSIS — F41.1 ANXIETY, GENERALIZED: Primary | ICD-10-CM

## 2020-10-28 DIAGNOSIS — M54.41 CHRONIC BILATERAL LOW BACK PAIN WITH BILATERAL SCIATICA: Primary | ICD-10-CM

## 2020-10-28 PROCEDURE — 99213 OFFICE O/P EST LOW 20 MIN: CPT | Performed by: FAMILY MEDICINE

## 2020-10-28 RX ORDER — IBUPROFEN 600 MG/1
600 TABLET ORAL EVERY 8 HOURS PRN
Qty: 90 TABLET | Refills: 2 | Status: SHIPPED | OUTPATIENT
Start: 2020-10-28

## 2020-10-28 RX ORDER — DULOXETIN HYDROCHLORIDE 60 MG/1
60 CAPSULE, DELAYED RELEASE ORAL DAILY
Qty: 30 CAPSULE | Refills: 2 | Status: SHIPPED | OUTPATIENT
Start: 2020-10-28 | End: 2021-02-01

## 2020-10-28 NOTE — PROGRESS NOTES
Discharge Summary  Discharge Summary from Physical Therapy Report    Patient: Elizabeth Lo   : 1975  Diagnosis/ICD-10 Code:  The primary encounter diagnosis was Chronic bilateral low back pain with bilateral sciatica. A diagnosis of Decreased activities of daily living (ADL) was also pertinent to this visit.   Referring practitioner: Howard Carvajal MD  Date of Initial Visit: Type: THERAPY  Noted: 2019  Today's Date: 10/28/2020      Number of Visits:      Date of Discharge: 2020    Goals: Not Met    Assessment/Reason for Discharge: COVID 19 pandemic    Discharge Plan: Continue with current home exercise program as instructed            Sheree Menjivar PT  Physical Therapist

## 2020-10-29 NOTE — PROGRESS NOTES
Subjective   Elizabeth Lo is a 45 y.o. female.     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  She would like to continue Cymbalta and needs a refill.    Anxiety  Presents for follow-up visit. Symptoms include nervous/anxious behavior. Patient reports no confusion, decreased concentration, dizziness, insomnia, nausea, panic, shortness of breath or suicidal ideas. Symptoms occur most days. The severity of symptoms is moderate. The quality of sleep is fair. Nighttime awakenings: occasional.            The following portions of the patient's history were reviewed and updated as appropriate: allergies, current medications, past social history and problem list.    Review of Systems   Constitutional: Negative for appetite change and fatigue.   Respiratory: Negative for chest tightness and shortness of breath.    Gastrointestinal: Negative for abdominal pain, diarrhea and nausea.   Neurological: Negative for dizziness, tremors, weakness, light-headedness and headaches.   Psychiatric/Behavioral: Positive for dysphoric mood and sleep disturbance. Negative for agitation, behavioral problems, confusion, decreased concentration and suicidal ideas. The patient is nervous/anxious. The patient does not have insomnia.        Objective   There were no vitals taken for this visit.  Physical Exam  Neurological:      Mental Status: She is alert.   Psychiatric:      Comments: Anxious mood       You have chosen to receive care through a telehealth visit.  Do you consent to use a video/audio connection for your medical care today? Yes  Assessment/Plan   Problems Addressed this Visit        Active Problems    Anxiety, generalized - Primary    Relevant Medications    DULoxetine (CYMBALTA) 60 MG capsule      Diagnoses       Codes Comments    Anxiety, generalized    -  Primary ICD-10-CM: F41.1  ICD-9-CM: 300.02           New Medications Ordered This Visit    Medications   • DULoxetine (CYMBALTA) 60 MG capsule     Sig: Take 1 capsule by mouth Daily.     Dispense:  30 capsule     Refill:  2     Follow up soon   • ibuprofen (ADVIL,MOTRIN) 600 MG tablet     Sig: Take 1 tablet by mouth Every 8 (Eight) Hours As Needed for Mild Pain .     Dispense:  90 tablet     Refill:  2     Encourage follow-up in the office next time.

## 2020-11-24 ENCOUNTER — TELEPHONE (OUTPATIENT)
Dept: FAMILY MEDICINE CLINIC | Facility: CLINIC | Age: 45
End: 2020-11-24

## 2020-11-24 NOTE — TELEPHONE ENCOUNTER
PATIENT WOULD LIKE A REFERRAL TO A NEW PAIN DOCTOR. THE PATIENTS CURRENT PAIN PROVIDERS OFFICE IS CLOSING. WOULD LIKE A NEW REFERRAL TO [facility]   PHONE NUMBER [phone]    THE NEW OFFICE WILL NEED THE LAST OFFICE VISIT NOTE.

## 2020-12-14 DIAGNOSIS — G89.29 CHRONIC BILATERAL LOW BACK PAIN WITH BILATERAL SCIATICA: Primary | ICD-10-CM

## 2020-12-14 DIAGNOSIS — M54.41 CHRONIC BILATERAL LOW BACK PAIN WITH BILATERAL SCIATICA: Primary | ICD-10-CM

## 2020-12-14 DIAGNOSIS — M54.42 CHRONIC BILATERAL LOW BACK PAIN WITH BILATERAL SCIATICA: Primary | ICD-10-CM

## 2021-01-08 ENCOUNTER — TELEPHONE (OUTPATIENT)
Dept: FAMILY MEDICINE CLINIC | Facility: CLINIC | Age: 46
End: 2021-01-08

## 2021-01-08 NOTE — TELEPHONE ENCOUNTER
THE PATIENT STATES THAT DR SINGLETARY MADE A REFERRAL TO A DIFFERENT PAIN DR BECAUSE THE PATIENT'S PREVIOUS DOCTOR CLOSED THE PRACTICE DUE TO COVID.  SHE STATES THAT THE APPOINTMENT WITH THE NEW PAIN DOCTOR IS NOT UNTIL 02/22/2021 AND THE PATIENT WILL BE OUT OF HER MEDICATION ON 1/20/2021 AND DUE TO BE FILLED THAT DAY.  THE PATIENT IS REQUESTING A CALL BACK TO DISCUSS IF SHE WOULD NEED AN APPOINTMENT WITH DR SINGLETARY TO HAVE THE PRESCRIPTIONS RENEWED BY HIM OR IF SHE WOULD JUST NEED TO REQUEST THE MEDICATIONS BE REFILLED CLOSER TO 1/20/2021.  PLEASE CALL AND ADVISE [phone]

## 2021-02-01 RX ORDER — DULOXETIN HYDROCHLORIDE 60 MG/1
60 CAPSULE, DELAYED RELEASE ORAL DAILY
Qty: 30 CAPSULE | Refills: 0 | Status: SHIPPED | OUTPATIENT
Start: 2021-02-01 | End: 2021-03-04

## 2021-02-08 ENCOUNTER — OFFICE VISIT (OUTPATIENT)
Dept: OBSTETRICS AND GYNECOLOGY | Facility: CLINIC | Age: 46
End: 2021-02-08

## 2021-02-08 VITALS — SYSTOLIC BLOOD PRESSURE: 110 MMHG | DIASTOLIC BLOOD PRESSURE: 72 MMHG | WEIGHT: 170 LBS | BODY MASS INDEX: 27.44 KG/M2

## 2021-02-08 DIAGNOSIS — Z30.9 ENCOUNTER FOR CONTRACEPTIVE MANAGEMENT, UNSPECIFIED TYPE: Primary | ICD-10-CM

## 2021-02-08 LAB
B-HCG UR QL: NEGATIVE
INTERNAL NEGATIVE CONTROL: NEGATIVE
INTERNAL POSITIVE CONTROL: POSITIVE
Lab: NORMAL

## 2021-02-08 PROCEDURE — 11983 REMOVE/INSERT DRUG IMPLANT: CPT | Performed by: OBSTETRICS & GYNECOLOGY

## 2021-02-08 PROCEDURE — 81025 URINE PREGNANCY TEST: CPT | Performed by: OBSTETRICS & GYNECOLOGY

## 2021-02-08 RX ORDER — TIZANIDINE 2 MG/1
TABLET ORAL EVERY 12 HOURS SCHEDULED
COMMUNITY
End: 2021-11-30

## 2021-02-08 NOTE — PROGRESS NOTES
Procedure: Nexplanon Removal and Reinsertion     Remove & Insert Drug Implant    Date/Time: 2/8/2021 5:11 PM  Performed by: Norah Kincaid MD  Authorized by: Norah Kincaid MD   Preparation: Patient was prepped and draped in the usual sterile fashion.  Local anesthesia used: yes  Anesthesia: local infiltration    Anesthesia:  Local anesthesia used: yes  Local Anesthetic: lidocaine 1% with epinephrine  Anesthetic total: 7 mL    Sedation:  Patient sedated: no    Patient tolerance: patient tolerated the procedure well with no immediate complications          Pre Dx: 1) Nexplanon removal and Reinsertion   Post Dx: 1) Nexplanon removal and Reinsertion     The risks, benefits, and alternatives to removal and reinsertion of the device have been discussed with the patient at length. All of her questions are answered. She is aware of the need for alternative means of contraception if desired. Verbal informed consent is obtained.    Able to easily palpate the device in the  Right arm. Additional imaging was not required. The device feels freely mobile and easily accessible. She was placed in the examining table in a supine position with her arm flexed at the elbow and hand under her head. The skin over this site is prepped with Betadine. 2-3 mL of 1% lidocaine with epinephrine was injected.    Downward pressure was applied on the proximal end of the implant nearest the axilla, and a 2-3 mm incision was made in a longitudinal direction of the arm at the tip of the implant closest to the elbow. The implant was then pushed gently toward the incision until the tip was visible. The fibrous capsule was opened with blunt dissection using a hemostat. The implant was grasp with a hemostat and was easily removed intact. It measured a  full 4 cm in length.    Steristrip was placed over incision site as well as a pressure dressing.     Nexplanon Insertion:    NDC#: 8023-1838-65  Lot #: [id number]  Exp date: 3/19/2023    device    The new insertion site on the Left arm was identified approximately 8-10 cm proximal to the humoral epicondyle and 3-4 cm below with sulcus of the triceps and biceps muscle. The skin at the insertion site was stretched by my thumb and index finger. The insertion site was anesthetized about 3 mL of 1% lidocaine. Nex, the needle tip was inserted, bevel side up, at an angle not greater than 30° to the skin surface, just until the skin was penetrated to below the bevel. The applicator was then lowered so that it was parallel to the arm. To minimize the chance of deep incision, the skin was tented upwards with the tip of the needle. The needle was then gently inserted to the full length and the device was fixed in place. I then slowly and carefully retracted the needle back by retracting the release lever. The obturator was seen in the device to determine that the nexplanon had been released.     Both the patient and I were easily able to feel the device under the skin. Steri-Strips were applied at the site, and the arm was gently wrapped with gauze.    There were no complications.   The patient tolerated the procedure well.    She was given a reminder card. She was advised to use condoms as a backup method for at least a week after insertion.     She understands that the device terms in three years.     Expectations, warning signs and limitations reviewed.  Patient to return to the office in 3 years for removal.  Patient gets her Pap smears and annual exams with her PCP.    Norah Kincaid MD  02/08/2021

## 2021-03-04 RX ORDER — DULOXETIN HYDROCHLORIDE 60 MG/1
60 CAPSULE, DELAYED RELEASE ORAL DAILY
Qty: 30 CAPSULE | Refills: 0 | Status: SHIPPED | OUTPATIENT
Start: 2021-03-04 | End: 2021-04-06

## 2021-04-06 RX ORDER — DULOXETIN HYDROCHLORIDE 60 MG/1
60 CAPSULE, DELAYED RELEASE ORAL DAILY
Qty: 30 CAPSULE | Refills: 1 | Status: SHIPPED | OUTPATIENT
Start: 2021-04-06 | End: 2021-06-18 | Stop reason: SDUPTHER

## 2021-06-18 RX ORDER — DULOXETIN HYDROCHLORIDE 60 MG/1
60 CAPSULE, DELAYED RELEASE ORAL DAILY
Qty: 30 CAPSULE | Refills: 0 | Status: SHIPPED | OUTPATIENT
Start: 2021-06-18 | End: 2021-07-21

## 2021-06-30 ENCOUNTER — TRANSCRIBE ORDERS (OUTPATIENT)
Dept: ADMINISTRATIVE | Facility: HOSPITAL | Age: 46
End: 2021-06-30

## 2021-06-30 ENCOUNTER — HOSPITAL ENCOUNTER (OUTPATIENT)
Dept: GENERAL RADIOLOGY | Facility: HOSPITAL | Age: 46
Discharge: HOME | End: 2021-06-30
Admitting: PHYSICAL MEDICINE & REHABILITATION

## 2021-06-30 DIAGNOSIS — G89.29 CHRONIC SI JOINT PAIN: ICD-10-CM

## 2021-06-30 DIAGNOSIS — M53.3 CHRONIC SI JOINT PAIN: ICD-10-CM

## 2021-06-30 DIAGNOSIS — G89.29 CHRONIC LOW BACK PAIN, UNSPECIFIED BACK PAIN LATERALITY, UNSPECIFIED WHETHER SCIATICA PRESENT: Primary | ICD-10-CM

## 2021-06-30 DIAGNOSIS — M54.50 CHRONIC LOW BACK PAIN, UNSPECIFIED BACK PAIN LATERALITY, UNSPECIFIED WHETHER SCIATICA PRESENT: Primary | ICD-10-CM

## 2021-06-30 PROCEDURE — 72202 X-RAY EXAM SI JOINTS 3/> VWS: CPT

## 2021-06-30 PROCEDURE — 72110 X-RAY EXAM L-2 SPINE 4/>VWS: CPT

## 2021-07-21 RX ORDER — DULOXETIN HYDROCHLORIDE 60 MG/1
60 CAPSULE, DELAYED RELEASE ORAL DAILY
Qty: 7 CAPSULE | Refills: 0 | Status: SHIPPED | OUTPATIENT
Start: 2021-07-21 | End: 2021-08-10

## 2021-08-10 RX ORDER — DULOXETIN HYDROCHLORIDE 60 MG/1
60 CAPSULE, DELAYED RELEASE ORAL DAILY
Qty: 7 CAPSULE | Refills: 0 | OUTPATIENT
Start: 2021-08-10 | End: 2021-11-30

## 2021-08-25 RX ORDER — DULOXETIN HYDROCHLORIDE 60 MG/1
60 CAPSULE, DELAYED RELEASE ORAL DAILY
Qty: 30 CAPSULE | Refills: 0 | Status: CANCELLED | OUTPATIENT
Start: 2021-08-25

## 2022-06-21 ENCOUNTER — OFFICE VISIT (OUTPATIENT)
Dept: FAMILY MEDICINE CLINIC | Facility: CLINIC | Age: 47
End: 2022-06-21

## 2022-06-21 VITALS
HEART RATE: 100 BPM | HEIGHT: 65 IN | TEMPERATURE: 98.6 F | SYSTOLIC BLOOD PRESSURE: 124 MMHG | OXYGEN SATURATION: 99 % | DIASTOLIC BLOOD PRESSURE: 82 MMHG | BODY MASS INDEX: 31.46 KG/M2 | WEIGHT: 188.8 LBS

## 2022-06-21 DIAGNOSIS — L30.8 OTHER ECZEMA: Primary | ICD-10-CM

## 2022-06-21 PROCEDURE — 99213 OFFICE O/P EST LOW 20 MIN: CPT | Performed by: FAMILY MEDICINE

## 2022-06-21 PROCEDURE — 96372 THER/PROPH/DIAG INJ SC/IM: CPT | Performed by: FAMILY MEDICINE

## 2022-06-21 RX ORDER — METHYLPREDNISOLONE ACETATE 40 MG/ML
40 INJECTION, SUSPENSION INTRA-ARTICULAR; INTRALESIONAL; INTRAMUSCULAR; SOFT TISSUE ONCE
Status: COMPLETED | OUTPATIENT
Start: 2022-06-21 | End: 2022-06-21

## 2022-06-21 RX ADMIN — METHYLPREDNISOLONE ACETATE 40 MG: 40 INJECTION, SUSPENSION INTRA-ARTICULAR; INTRALESIONAL; INTRAMUSCULAR; SOFT TISSUE at 09:38

## 2022-06-22 NOTE — PROGRESS NOTES
"Chief Complaint  Blister (More on feet, few on hands)    Subjective        Elizabeth Lo presents to [facility]  Rash  This is a recurrent problem. The current episode started 1 to 4 weeks ago. The problem is unchanged. The affected locations include the left hand, right hand, right ankle and left ankle. The rash is characterized by blistering, redness, itchiness and peeling. She was exposed to nothing. Pertinent negatives include no facial edema or joint pain. Past treatments include antihistamine and anti-itch cream. The treatment provided mild relief.       Objective   Vital Signs:  /82   Pulse 100   Temp 98.6 °F (37 °C)   Ht 165.1 cm (65\")   Wt 85.6 kg (188 lb 12.8 oz)   SpO2 99%   BMI 31.42 kg/m²   Estimated body mass index is 31.42 kg/m² as calculated from the following:    Height as of this encounter: 165.1 cm (65\").    Weight as of this encounter: 85.6 kg (188 lb 12.8 oz).          Physical Exam  Vitals and nursing note reviewed.   Constitutional:       Appearance: Normal appearance.   HENT:      Head: Normocephalic and atraumatic.      Right Ear: Tympanic membrane normal.      Left Ear: Tympanic membrane normal.      Nose: Nose normal.      Mouth/Throat:      Mouth: Mucous membranes are dry.   Eyes:      Conjunctiva/sclera: Conjunctivae normal.      Pupils: Pupils are equal, round, and reactive to light.   Cardiovascular:      Rate and Rhythm: Normal rate and regular rhythm.   Musculoskeletal:      Cervical back: Neck supple.   Skin:     Findings: Rash present.      Comments: Eczematous rash palms and outer ankles and some on soles   Neurological:      Mental Status: She is alert.        Result Review :                Assessment and Plan {CC Problem List  Visit Diagnosis   ROS  Review (Popup)  [facility] Maintenance  Quality  BestPractice  Medications  SmartSets  SnapShot Encounters  Media :23}  Diagnoses and all orders for this visit:    1. Other " eczema (Primary)  -     methylPREDNISolone acetate (DEPO-medrol) injection 40 mg  -     triamcinolone (KENALOG) 0.1 % ointment; Apply 1 application topically to the appropriate area as directed 2 (Two) Times a Day.  Dispense: 30 g; Refill: 1    follow up with dermatology         Follow Up   No follow-ups on file.  Patient was given instructions and counseling regarding her condition or for health maintenance advice. Please see specific information pulled into the AVS if appropriate.

## 2023-04-29 PROCEDURE — 87070 CULTURE OTHR SPECIMN AEROBIC: CPT | Performed by: PERSONAL EMERGENCY RESPONSE ATTENDANT

## 2023-04-29 PROCEDURE — 87205 SMEAR GRAM STAIN: CPT | Performed by: PERSONAL EMERGENCY RESPONSE ATTENDANT

## 2023-05-03 ENCOUNTER — OFFICE VISIT (OUTPATIENT)
Dept: FAMILY MEDICINE CLINIC | Facility: CLINIC | Age: 48
End: 2023-05-03
Payer: COMMERCIAL

## 2023-05-03 VITALS
HEART RATE: 90 BPM | HEIGHT: 65 IN | SYSTOLIC BLOOD PRESSURE: 108 MMHG | DIASTOLIC BLOOD PRESSURE: 66 MMHG | BODY MASS INDEX: 31.46 KG/M2 | TEMPERATURE: 98.2 F | WEIGHT: 188.8 LBS | OXYGEN SATURATION: 99 %

## 2023-05-03 DIAGNOSIS — Z12.11 ENCOUNTER FOR COLORECTAL CANCER SCREENING: ICD-10-CM

## 2023-05-03 DIAGNOSIS — L98.9 SKIN LESIONS: ICD-10-CM

## 2023-05-03 DIAGNOSIS — Z12.12 ENCOUNTER FOR COLORECTAL CANCER SCREENING: ICD-10-CM

## 2023-05-03 DIAGNOSIS — L72.3 SEBACEOUS CYST: Primary | ICD-10-CM

## 2023-05-03 RX ORDER — VALACYCLOVIR HYDROCHLORIDE 1 G/1
1000 TABLET, FILM COATED ORAL DAILY
Qty: 5 TABLET | Refills: 1 | Status: SHIPPED | OUTPATIENT
Start: 2023-05-03

## 2023-05-03 RX ORDER — DOXYCYCLINE 100 MG/1
100 CAPSULE ORAL 2 TIMES DAILY
Qty: 28 CAPSULE | Refills: 0 | Status: SHIPPED | OUTPATIENT
Start: 2023-05-03

## 2023-05-03 NOTE — PROGRESS NOTES
Follow Up Office Visit      Patient Name: Elizabeth Lo  : 1975   MRN: [id number]     Chief Complaint:    Chief Complaint   Patient presents with   • Mass     Lump behind left ear.       History of Present Illness: Elizabeth Lo is a 47 y.o. female who is here today to follow up with cyst behind her left ear that has been there chronically.  Patient reports that she went to  recently and had a drain.  She is on antibiotic Bactrim.  Reports that it likely has improved some.  Denies any fevers    Skin lesions when she gets really hot. No night sweats.  She has pictures of these lesions on her foot.  There are little bumps that look vesicular.    Patient has been given treatment for before which has not helped.  She never Penederm.      Review of systems positive for skin lesions    Physical exam: Behind left ear is scab and subcutaneous cyst.  Slight drainage noted from lesion.  No erythema surrounding lesion.      Subjective        I have reviewed and the following portions of the patient's history were updated as appropriate: past family history, past medical history, past social history, past surgical history and problem list.    Medications:     Current Outpatient Medications:   •  ibuprofen (ADVIL,MOTRIN) 600 MG tablet, Take 1 tablet by mouth Every 8 (Eight) Hours As Needed for Mild Pain ., Disp: 90 tablet, Rfl: 2  •  mupirocin (BACTROBAN) 2 % ointment, Apply 1 application topically to the appropriate area as directed 3 (Three) Times a Day for 7 days., Disp: 15 g, Rfl: 0  •  sulfamethoxazole-trimethoprim (BACTRIM DS,SEPTRA DS) 800-160 MG per tablet, Take 1 tablet by mouth 2 (Two) Times a Day for 5 days., Disp: 10 tablet, Rfl: 0  •  triamcinolone (KENALOG) 0.1 % ointment, Apply 1 application topically to the appropriate area as directed 2 (Two) Times a Day., Disp: 30 g, Rfl: 1  •  doxycycline (MONODOX) 100 MG capsule, Take 1 capsule by mouth 2 (Two) Times a Day., Disp: 28 capsule, Rfl: 0  •   "valACYclovir (Valtrex) 1000 MG tablet, Take 1 tablet by mouth Daily., Disp: 5 tablet, Rfl: 1    Allergies:   No Known Allergies    Objective     Physical Exam: Please see above  Vital Signs:   Vitals:    05/03/23 1132   BP: 108/66   Pulse: 90   Temp: 98.2 °F (36.8 °C)   SpO2: 99%   Weight: 85.6 kg (188 lb 12.8 oz)   Height: 165.1 cm (65\")     Body mass index is 31.42 kg/m².          Assessment / Plan      Assessment/Plan:   Diagnoses and all orders for this visit:    1. Sebaceous cyst (Primary)  -     doxycycline (MONODOX) 100 MG capsule; Take 1 capsule by mouth 2 (Two) Times a Day.  Dispense: 28 capsule; Refill: 0    2. Encounter for colorectal cancer screening  -     Ambulatory Referral For Screening Colonoscopy    3. Skin lesions  -     Ambulatory Referral to Dermatology  -     valACYclovir (Valtrex) 1000 MG tablet; Take 1 tablet by mouth Daily.  Dispense: 5 tablet; Refill: 1    Patient with subcutaneous cyst.  We will continue antibiotic for 2 weeks.  May refill antibiotic if it returns.  Patient to come back in a couple months for excision.    Refer to Derm for abnormal skin lesions.  Looks to be herpetic qian but could be a type of autoimmune disease or reaction to hot/cold.  Patient to come in with lesions for punch biopsy    Colonoscopy discussed today.    Chronic recurrent lesions on feet noted-discussed chronic lesions today and prescribed new medication as above.  Level 4 visit    Follow Up:   Return in about 6 weeks (around 6/14/2023) for Recheck.    Franc Haddad DO  [facility] [place]   "

## 2023-06-16 ENCOUNTER — OFFICE VISIT (OUTPATIENT)
Dept: FAMILY MEDICINE CLINIC | Facility: CLINIC | Age: 48
End: 2023-06-16
Payer: COMMERCIAL

## 2023-06-16 VITALS
HEIGHT: 65 IN | TEMPERATURE: 97.7 F | WEIGHT: 189.4 LBS | DIASTOLIC BLOOD PRESSURE: 68 MMHG | BODY MASS INDEX: 31.56 KG/M2 | SYSTOLIC BLOOD PRESSURE: 108 MMHG | HEART RATE: 97 BPM

## 2023-06-16 DIAGNOSIS — L98.9 SKIN LESIONS: Primary | ICD-10-CM

## 2023-06-16 RX ORDER — VALACYCLOVIR HYDROCHLORIDE 1 G/1
2000 TABLET, FILM COATED ORAL 2 TIMES DAILY
Qty: 4 TABLET | Refills: 5 | Status: SHIPPED | OUTPATIENT
Start: 2023-06-16

## 2023-06-16 NOTE — PROGRESS NOTES
"     Follow Up Office Visit      Patient Name: Elizabeth Lo  : 1975   MRN: [id number]     Chief Complaint:    Chief Complaint   Patient presents with    Blister     On both hands. Establish care.       History of Present Illness: Elizabeth Lo is a 47 y.o. female who is here today to follow up with hand and foot lesions that are vesicular and are painful.  These are chronic lesions that have been around for years.  She has not seen dermatology but has an appointment coming up.  Patient says these are recurrent and they did not improve with Valtrex but they may have helped a little bit.  She was not sure but they did go away.      Physical exam: Small vesicular lesions noted on palms, fingers.      Subjective        I have reviewed and the following portions of the patient's history were updated as appropriate: past family history, past medical history, past social history, past surgical history and problem list.    Medications:     Current Outpatient Medications:     triamcinolone (KENALOG) 0.1 % ointment, Apply 1 application topically to the appropriate area as directed 2 (Two) Times a Day., Disp: 30 g, Rfl: 1    ibuprofen (ADVIL,MOTRIN) 600 MG tablet, Take 1 tablet by mouth Every 8 (Eight) Hours As Needed for Mild Pain ., Disp: 90 tablet, Rfl: 2    valACYclovir (Valtrex) 1000 MG tablet, Take 2 tablets by mouth 2 (Two) Times a Day. Repeat treatment with repeat outbreaks, Disp: 4 tablet, Rfl: 5    Allergies:   No Known Allergies    Objective     Physical Exam: Please see above  Vital Signs:   Vitals:    23 0832   BP: 108/68   Pulse: 97   Temp: 97.7 °F (36.5 °C)   Weight: 85.9 kg (189 lb 6.4 oz)   Height: 165.1 cm (65\")     Body mass index is 31.52 kg/m².          Assessment / Plan      Assessment/Plan:   Diagnoses and all orders for this visit:    1. Skin lesions (Primary)  -     valACYclovir (Valtrex) 1000 MG tablet; Take 2 tablets by mouth 2 (Two) Times a Day. Repeat treatment with repeat " outbreaks  Dispense: 4 tablet; Refill: 5  -     triamcinolone (KENALOG) 0.1 % ointment; Apply 1 application topically to the appropriate area as directed 2 (Two) Times a Day.  Dispense: 30 g; Refill: 1  -     TISSUE EXAM, [facility] ([facility]); Future  -     TISSUE EXAM, [facility] ([facility])  -     Biopsy     Biopsy    Date/Time: 6/16/2023 11:07 AM  Performed by: Franc Haddad DO  Authorized by: Franc Haddad DO     Procedure Details - Skin Biopsy:     Body area: upper extremity    Upper extremity location: L hand    Initial size (mm): 1.5    Final defect size (mm): 3    Malignancy: benign lesion      Destruction method: punch biopsy      Comments:   Patient seated for procedure.  Left hand cleaned with alcohol pad and lesion injected with 3 mL of 1% lidocaine without epi.  Lesion was then cleaned using iodine and wiped clean with alcohol pad.  3 mm punch used for biopsy.  4-0 Ethilon suture used to close the lesion.  Patient tolerated procedure well without any acute elevations.  Bandage used and precautions given to patient for infection.     Follow-up in 10 days for suture removal.  1 suture placed on left hand-review findings of biopsy at that time.  We will call patient if biopsy results are back sooner    Follow Up:   Return in about 10 days (around 6/26/2023), or if symptoms worsen or fail to improve, for Recheck.    Franc Haddad DO  [facility] [place] [facility] [place]

## 2023-06-23 LAB — REF LAB TEST METHOD: NORMAL

## 2023-08-01 RX ORDER — SODIUM PICOSULFATE, MAGNESIUM OXIDE, AND ANHYDROUS CITRIC ACID 10; 3.5; 12 MG/160ML; G/160ML; G/160ML
350 LIQUID ORAL TAKE AS DIRECTED
Qty: 350 ML | Refills: 0 | Status: SHIPPED | OUTPATIENT
Start: 2023-08-01

## 2023-10-18 RX ORDER — SODIUM PICOSULFATE, MAGNESIUM OXIDE, AND ANHYDROUS CITRIC ACID 10; 3.5; 12 MG/160ML; G/160ML; G/160ML
350 LIQUID ORAL TAKE AS DIRECTED
Qty: 350 ML | Refills: 0 | Status: SHIPPED | OUTPATIENT
Start: 2023-10-18

## 2023-10-20 ENCOUNTER — PRIOR AUTHORIZATION (OUTPATIENT)
Dept: GASTROENTEROLOGY | Facility: CLINIC | Age: 48
End: 2023-10-20
Payer: COMMERCIAL